# Patient Record
Sex: MALE | Race: BLACK OR AFRICAN AMERICAN | Employment: PART TIME | URBAN - METROPOLITAN AREA
[De-identification: names, ages, dates, MRNs, and addresses within clinical notes are randomized per-mention and may not be internally consistent; named-entity substitution may affect disease eponyms.]

---

## 2017-01-04 DIAGNOSIS — E04.9 GOITER: Primary | ICD-10-CM

## 2017-01-04 DIAGNOSIS — E05.90 HYPERTHYROIDISM: ICD-10-CM

## 2017-01-23 ENCOUNTER — OFFICE VISIT (OUTPATIENT)
Dept: ENDOCRINOLOGY | Age: 52
End: 2017-01-23

## 2017-01-23 VITALS
HEIGHT: 72 IN | WEIGHT: 235 LBS | BODY MASS INDEX: 31.83 KG/M2 | HEART RATE: 87 BPM | DIASTOLIC BLOOD PRESSURE: 71 MMHG | SYSTOLIC BLOOD PRESSURE: 117 MMHG

## 2017-01-23 DIAGNOSIS — Z79.4 TYPE 2 DIABETES MELLITUS WITH COMPLICATION, WITH LONG-TERM CURRENT USE OF INSULIN (HCC): Primary | ICD-10-CM

## 2017-01-23 DIAGNOSIS — R80.9 MICROALBUMINURIA: ICD-10-CM

## 2017-01-23 DIAGNOSIS — E11.8 TYPE 2 DIABETES MELLITUS WITH COMPLICATION, WITH LONG-TERM CURRENT USE OF INSULIN (HCC): Primary | ICD-10-CM

## 2017-01-23 DIAGNOSIS — E04.9 GOITER: ICD-10-CM

## 2017-01-23 DIAGNOSIS — E05.90 HYPERTHYROIDISM: ICD-10-CM

## 2017-01-23 DIAGNOSIS — I10 ESSENTIAL HYPERTENSION: ICD-10-CM

## 2017-01-23 RX ORDER — LISINOPRIL 40 MG/1
40 TABLET ORAL DAILY
COMMUNITY
End: 2017-06-02 | Stop reason: SDUPTHER

## 2017-01-23 NOTE — PROGRESS NOTES
History of Present Illness: Ольга Davis. is a 45 y.o. male presents for follow-up of diabetes and hyperthyroidism/goiter  He has had diabetes for 20 years, since 1995. A1c has been stable at 7.0    Has Graves disease. Started on methimazole in early August 2016. Labs were at goal in 10/2016 on methimazole 10 mg daily. Due to a miscomunication he stopped methimazole around the holidays when it was determined surgery would be best long-term treatment option for him. Reports his enlarged thyroid dates back to late 1990s. Since stopping methimazole he feels well. No heat intolerance, anxiety problems, trouble sleeping. Wt stable to increased. Has continued having problems swallowing. Sleeping is also a challenge. Has trouble breathing when he sleeps on his right side. Neck size of shirt has increased from 18\" to 19\" over last few years. Diabetes related complications:  + microalbuminuria  No known retinopathy  No sx of nephropathy    Current diabetes regimen:  NPH  16 units twice daily  Regular 4 units breakfast, lunch, night. Glucoses:  Check infrequently. No sx of lows. 'mid 100s'  (140-180)    Diet:  Trying to watch carb intake     Blood pressure: lisinopril 40 mg daily.  (fould telmisartan/amlodipine did not control as well)     Lipids: No statin therapy currently. Social:  Just . Had honeymoon over holidays and New Year    Past Medical History   Diagnosis Date    Albuminuria     Hypertension     Type 2 diabetes mellitus ([facility] [address]      1995     Current Outpatient Prescriptions   Medication Sig    lisinopril (PRINIVIL, ZESTRIL) 40 mg tablet Take 40 mg by mouth daily.  metFORMIN (GLUCOPHAGE) 1,000 mg tablet Take 1 Tab by mouth two (2) times daily (with meals).  insulin regular (NOVOLIN R, HUMULIN R) 100 unit/mL injection 4-10 units with meals.  insulin NPH (NOVOLIN N, HUMULIN N) 100 unit/mL injection 16 Units by SubCUTAneous route every twelve (12) hours.  (Patient taking differently: 16 Units by SubCUTAneous route every twelve (12) hours. Indications: PAP medication)    aspirin delayed-release (ASPIR-LOW) 81 mg tablet Take 81 mg by mouth daily.  amitriptyline (ELAVIL) 25 mg tablet Take 2 Tabs by mouth nightly.  Telmisartan-amLODIPine 80-5 mg tab Take 1 Each by mouth nightly. For blood pressure    methimazole (TAPAZOLE) 10 mg tablet Take 2 Tabs by mouth daily. For hyperthyroidism (Patient taking differently: Take 10 mg by mouth daily. For hyperthyroidism)     No current facility-administered medications for this visit.       Allergies   Allergen Reactions    Penicillins Hives     Review of Systems:  - Eyes: no blurry vision or double vision - saw eye doctor and had had dilated eye exam   - Cardiovascular: no chest pain  - Respiratory: see HPI  Physical Examination:  Visit Vitals    /71    Pulse 87    Ht 6' 0.44\" (1.84 m)    Wt 235 lb (106.6 kg)    BMI 31.49 kg/m2   -   - General: pleasant, no distress, normal gait   HEENT: hearing intact, EOMI, clear sclera without icterus  - Neck: + right thyromegaly diffuse and soft, isthmus and left lobe are mildly enlarged  - Cardiovascular: regular, normal rate   - Respiratory: normal effort  - Integumentary: no edema  - Psychiatric: normal mood and affect    Data Reviewed:   Component      Latest Ref Rng & Units 10/25/2016 10/25/2016 10/25/2016 10/25/2016          12:13 PM 12:13 PM 12:13 PM 12:13 PM   Glucose      65 - 99 mg/dL    78   BUN      6 - 24 mg/dL    22   Creatinine      0.76 - 1.27 mg/dL    1.70 (H)   GFR est non-AA      >59 mL/min/1.73    46 (L)   GFR est AA      >59 mL/min/1.73    53 (L)   BUN/Creatinine ratio      9 - 20    13   Sodium      136 - 144 mmol/L    140   Potassium      3.5 - 5.2 mmol/L    4.7   Chloride      97 - 106 mmol/L    102   CO2      18 - 29 mmol/L    21   Calcium      8.7 - 10.2 mg/dL    9.0   Hemoglobin A1c, (calculated)      4.8 - 5.6 %   7.0 (H)    Estimated average glucose mg/dL   154    T4, Free      0.82 - 1.77 ng/dL  1.03     TSH      0.450 - 4.500 uIU/mL 1.300        Component      Latest Ref Rng & Units 7/19/2016 7/19/2016 7/19/2016           3:33 PM  3:33 PM  3:33 PM   Creatinine, urine      Not Estab. mg/dL   129.0   Microalbumin, urine      Not Estab. ug/mL   235.4   Microalbumin/Creat. Ratio      0.0 - 30.0 mg/g creat   182.5 (H)   Thyroid peroxidase Ab      0 - 34 IU/mL  <6    Thyrotropin Receptor Ab, serum      0.00 - 1.75 IU/L 3.89 (H)       Assessment/Plan:   1. Type 2 diabetes mellitus with complication, with long-term current use of insulin (HCC)   - sounds well controlled  - check A1c  - continue NPH 16 units twice daily and regular 4 units with meals  - continue dietary efforts   2. Hyperthyroidism   - labs off methimazole for a few months  - likely resume until surgery. 10 mg worked well in fall. 3. Goiter   - due to Graves' disease AND nodules  - recommend thyroidectomy. Referred to Dr Rojelio Hackett   4. Microalbuminuria - BP, diabetes control, continue lisinopril   5. Essential hypertension - controlled. Continue lisinopril     Patient Instructions   Diabetes:  Continue metformin. - continue NPH 16 units twice daily  - Humalog - continue 4 units in AM, can take 4-8 units with lunch and dinner    Hyperthyroidism and enlarged thyroid  Repeat labs off methimazole    If hyperthyroid, may have you resume methimazole until thyroid removed surgically    Schedule appointment with Dr Rojelio [facility]  [address] [place] [address] Street, [address]    Phone: [phone]                 Follow-up Disposition:  Return in about 3 months (around 4/23/2017).     Copy sent to:

## 2017-01-23 NOTE — MR AVS SNAPSHOT
Visit Information Date & Time Provider Department Dept. Phone Encounter #  
 1/23/2017  8:50 AM Seth Dorantes [facility] [phone] [id number] Follow-up Instructions Return in about 3 months (around 4/23/2017). Upcoming Health Maintenance Date Due  
 EYE EXAM RETINAL OR DILATED Q1 11/4/1974 Pneumococcal 19-64 Medium Risk (1 of 1 - PPSV23) 11/4/1983 DTaP/Tdap/Td series (1 - Tdap) 5/17/2010 FOBT Q 1 YEAR AGE 50-75 11/4/2014 INFLUENZA AGE 9 TO ADULT 8/1/2016 HEMOGLOBIN A1C Q6M 4/25/2017 LIPID PANEL Q1 5/12/2017 FOOT EXAM Q1 5/17/2017 MICROALBUMIN Q1 7/19/2017 Allergies as of 1/23/2017  Review Complete On: 1/23/2017 By: Larissa Ford MD  
  
 Severity Noted Reaction Type Reactions Penicillins  05/15/2010    Hives Current Immunizations  Never Reviewed Name Date  
 TD Vaccine 5/16/2010  2:00 AM  
  
 Not reviewed this visit You Were Diagnosed With   
  
 Codes Comments Type 2 diabetes mellitus with complication, with long-term current use of insulin (HCC)    -  Primary ICD-10-CM: E11.8, Z79.4 ICD-9-CM: 250.90, V58.67 Hyperthyroidism     ICD-10-CM: E05.90 ICD-9-CM: 242.90 Goiter     ICD-10-CM: E04.9 ICD-9-CM: 240.9 Vitals BP Pulse Height(growth percentile) Weight(growth percentile) BMI Smoking Status 117/71 87 6' 0.44\" (1.84 m) 235 lb (106.6 kg) 31.49 kg/m2 Never Smoker Vitals History BMI and BSA Data Body Mass Index Body Surface Area  
 31.49 kg/m 2 2.33 m 2 Preferred Pharmacy Pharmacy Name Phone [facility] [address] Your Updated Medication List  
  
   
This list is accurate as of: 1/23/17  9:31 AM.  Always use your most recent med list.  
  
  
  
  
 ASPIR-LOW 81 mg tablet Generic drug:  aspirin delayed-release Take 81 mg by mouth daily. insulin  unit/mL injection Commonly known as:  NOVOLIN N, HUMULIN N  
16 Units by SubCUTAneous route every twelve (12) hours. insulin regular 100 unit/mL injection Commonly known as:  NOVOLIN R, HUMULIN R  
4-10 units with meals. lisinopril 40 mg tablet Commonly known as:  Elayne Burger Take 40 mg by mouth daily. metFORMIN 1,000 mg tablet Commonly known as:  GLUCOPHAGE Take 1 Tab by mouth two (2) times daily (with meals). methIMAzole 10 mg tablet Commonly known as:  TAPAZOLE Take 2 Tabs by mouth daily. For hyperthyroidism We Performed the Following HEMOGLOBIN A1C WITH EAG [21693 CPT(R)] LIPID PANEL [88671 CPT(R)] METABOLIC PANEL, COMPREHENSIVE [33010 CPT(R)] T4, FREE [id number] CPT(R)] TSH 3RD GENERATION [59568 CPT(R)] Follow-up Instructions Return in about 3 months (around 4/23/2017). Patient Instructions Diabetes: 
Continue metformin. - continue NPH 16 units twice daily - Humalog - continue 4 units in AM, can take 4-8 units with lunch and dinner Hyperthyroidism and enlarged thyroid Repeat labs off methimazole If hyperthyroid, may have you resume methimazole until thyroid removed surgically Schedule appointment with Dr Valle Grade Dr Albina Figueroa [facility] [address] Phone: [phone] Introducing [facility]! Zev Grossman introduces [employer] patient portal. Now you can access parts of your medical record, email your doctor's office, and request medication refills online. 1. In your internet browser, go to [email or web address] 2. Click on the First Time User? Click Here link in the Sign In box. You will see the New Member Sign Up page. 3. Enter your [employer] Access Code exactly as it appears below. You will not need to use this code after youve completed the sign-up process.  If you do not sign up before the expiration date, you must request a new code. · [employer] Access Code: [email or web address] Expires: 4/23/2017  9:31 AM 
 
4. Enter the last four digits of your Social Security Number (xxxx) and Date of Birth (mm/dd/yyyy) as indicated and click Submit. You will be taken to the next sign-up page. 5. Create a [employer] ID. This will be your [employer] login ID and cannot be changed, so think of one that is secure and easy to remember. 6. Create a [employer] password. You can change your password at any time. 7. Enter your Password Reset Question and Answer. This can be used at a later time if you forget your password. 8. Enter your e-mail address. You will receive e-mail notification when new information is available in [address] 9. Click Sign Up. You can now view and download portions of your medical record. 10. Click the Download Summary menu link to download a portable copy of your medical information. If you have questions, please visit the Frequently Asked Questions section of the [employer] website. Remember, [employer] is NOT to be used for urgent needs. For medical emergencies, dial 911. Now available from your iPhone and Android! Please provide this summary of care documentation to your next provider. Your primary care clinician is listed as Avis Dimas. If you have any questions after today's visit, please call [phone].

## 2017-01-23 NOTE — PATIENT INSTRUCTIONS
Diabetes:  Continue metformin.     - continue NPH 16 units twice daily  - Humalog - continue 4 units in AM, can take 4-8 units with lunch and dinner    Hyperthyroidism and enlarged thyroid  Repeat labs off methimazole    If hyperthyroid, may have you resume methimazole until thyroid removed surgically    Schedule appointment with Dr Valle Grade  Dr Bello Huggins [facility]  [address], [address]    Phone: [phone]

## 2017-01-24 LAB
ALBUMIN SERPL-MCNC: 3.8 G/DL (ref 3.5–5.5)
ALBUMIN/GLOB SERPL: 1.2 {RATIO} (ref 1.1–2.5)
ALP SERPL-CCNC: 139 IU/L (ref 39–117)
ALT SERPL-CCNC: 30 IU/L (ref 0–44)
AST SERPL-CCNC: 29 IU/L (ref 0–40)
BILIRUB SERPL-MCNC: 0.3 MG/DL (ref 0–1.2)
BUN SERPL-MCNC: 22 MG/DL (ref 6–24)
BUN/CREAT SERPL: 16 (ref 9–20)
CALCIUM SERPL-MCNC: 9.1 MG/DL (ref 8.7–10.2)
CHLORIDE SERPL-SCNC: 100 MMOL/L (ref 96–106)
CHOLEST SERPL-MCNC: 183 MG/DL (ref 100–199)
CO2 SERPL-SCNC: 22 MMOL/L (ref 18–29)
CREAT SERPL-MCNC: 1.41 MG/DL (ref 0.76–1.27)
EST. AVERAGE GLUCOSE BLD GHB EST-MCNC: 192 MG/DL
GLOBULIN SER CALC-MCNC: 3.1 G/DL (ref 1.5–4.5)
GLUCOSE SERPL-MCNC: 85 MG/DL (ref 65–99)
HBA1C MFR BLD: 8.3 % (ref 4.8–5.6)
HDLC SERPL-MCNC: 56 MG/DL
LDLC SERPL CALC-MCNC: 108 MG/DL (ref 0–99)
POTASSIUM SERPL-SCNC: 4.7 MMOL/L (ref 3.5–5.2)
PROT SERPL-MCNC: 6.9 G/DL (ref 6–8.5)
SODIUM SERPL-SCNC: 137 MMOL/L (ref 134–144)
T4 FREE SERPL-MCNC: 1.58 NG/DL (ref 0.82–1.77)
TRIGL SERPL-MCNC: 96 MG/DL (ref 0–149)
TSH SERPL DL<=0.005 MIU/L-ACNC: <0.006 UIU/ML (ref 0.45–4.5)
VLDLC SERPL CALC-MCNC: 19 MG/DL (ref 5–40)

## 2017-03-11 NOTE — PROGRESS NOTES
Juve Altamirano,  Please call and mail lab letter. Thank you. Kidney function improved. Diabetes - needs to monitor more  Thyroid - mildly hyperthyroid. Will recommend he resume methimazole 10 mg. He was referred to Dr Andreas Ch as he desired thyroidectomy due to size of gland. I do not think he has seen her.

## 2017-03-23 NOTE — PROGRESS NOTES
I spoke with patient and reviewed Dr. Paulino Moss notes regarding his lab results and he voiced understanding. Patient stated he has decided against the thyroidectomy. He would like to have the LAKE treatment. He is traveling this summer with work and would like this ordered as soon as possible.

## 2017-03-28 DIAGNOSIS — E05.90 HYPERTHYROIDISM: Primary | ICD-10-CM

## 2017-03-28 DIAGNOSIS — E04.9 GOITER: ICD-10-CM

## 2017-03-31 NOTE — PROGRESS NOTES
Per Dr. Michelle Granger: Christy Lees ordered this. Please be sure he stops the methimazole 5-7 days prior to the LAKE treatment. He should also have labs and follow-up visit 10-12 weeks after LAKE therapy. \"  I called patient and read Dr. Kirstie Sauer message and he voiced understanding. Patient will await call from scheduling.

## 2017-04-03 ENCOUNTER — APPOINTMENT (OUTPATIENT)
Dept: GENERAL RADIOLOGY | Age: 52
End: 2017-04-03
Attending: EMERGENCY MEDICINE
Payer: COMMERCIAL

## 2017-04-03 ENCOUNTER — HOSPITAL ENCOUNTER (EMERGENCY)
Age: 52
Discharge: HOME | End: 2017-04-03
Attending: EMERGENCY MEDICINE | Admitting: EMERGENCY MEDICINE
Payer: COMMERCIAL

## 2017-04-03 VITALS
TEMPERATURE: 98.7 F | WEIGHT: 235 LBS | HEIGHT: 74 IN | HEART RATE: 113 BPM | OXYGEN SATURATION: 96 % | BODY MASS INDEX: 30.16 KG/M2 | DIASTOLIC BLOOD PRESSURE: 90 MMHG | SYSTOLIC BLOOD PRESSURE: 163 MMHG | RESPIRATION RATE: 15 BRPM

## 2017-04-03 DIAGNOSIS — R73.9 HYPERGLYCEMIA: ICD-10-CM

## 2017-04-03 DIAGNOSIS — J18.9 CAP (COMMUNITY ACQUIRED PNEUMONIA): Primary | ICD-10-CM

## 2017-04-03 LAB
ALBUMIN SERPL BCP-MCNC: 3.3 G/DL (ref 3.5–5)
ALBUMIN/GLOB SERPL: 0.8 {RATIO} (ref 1.1–2.2)
ALP SERPL-CCNC: 146 U/L (ref 45–117)
ALT SERPL-CCNC: 13 U/L (ref 12–78)
ANION GAP BLD CALC-SCNC: 8 MMOL/L (ref 5–15)
APPEARANCE UR: CLEAR
AST SERPL W P-5'-P-CCNC: 5 U/L (ref 15–37)
BACTERIA URNS QL MICRO: NEGATIVE /HPF
BASOPHILS # BLD AUTO: 0 K/UL (ref 0–0.1)
BASOPHILS # BLD: 0 % (ref 0–1)
BILIRUB SERPL-MCNC: 0.4 MG/DL (ref 0.2–1)
BILIRUB UR QL: NEGATIVE
BUN SERPL-MCNC: 29 MG/DL (ref 6–20)
BUN/CREAT SERPL: 13 (ref 12–20)
CALCIUM SERPL-MCNC: 9 MG/DL (ref 8.5–10.1)
CHLORIDE SERPL-SCNC: 95 MMOL/L (ref 97–108)
CK MB CFR SERPL CALC: 1.9 % (ref 0–2.5)
CK MB SERPL-MCNC: 1.7 NG/ML (ref 5–25)
CK SERPL-CCNC: 90 U/L (ref 39–308)
CO2 SERPL-SCNC: 27 MMOL/L (ref 21–32)
COLOR UR: ABNORMAL
CREAT SERPL-MCNC: 2.22 MG/DL (ref 0.7–1.3)
EOSINOPHIL # BLD: 0.1 K/UL (ref 0–0.4)
EOSINOPHIL NFR BLD: 0 % (ref 0–7)
EPITH CASTS URNS QL MICRO: ABNORMAL /LPF
ERYTHROCYTE [DISTWIDTH] IN BLOOD BY AUTOMATED COUNT: 12.1 % (ref 11.5–14.5)
GLOBULIN SER CALC-MCNC: 4.4 G/DL (ref 2–4)
GLUCOSE BLD STRIP.AUTO-MCNC: 321 MG/DL (ref 65–100)
GLUCOSE SERPL-MCNC: 347 MG/DL (ref 65–100)
GLUCOSE UR STRIP.AUTO-MCNC: >1000 MG/DL
HCT VFR BLD AUTO: 40.4 % (ref 36.6–50.3)
HGB BLD-MCNC: 14.2 G/DL (ref 12.1–17)
HGB UR QL STRIP: ABNORMAL
HYALINE CASTS URNS QL MICRO: ABNORMAL /LPF (ref 0–5)
KETONES UR QL STRIP.AUTO: NEGATIVE MG/DL
LEUKOCYTE ESTERASE UR QL STRIP.AUTO: NEGATIVE
LIPASE SERPL-CCNC: 159 U/L (ref 73–393)
LYMPHOCYTES # BLD AUTO: 21 % (ref 12–49)
LYMPHOCYTES # BLD: 3.3 K/UL (ref 0.8–3.5)
MCH RBC QN AUTO: 29.4 PG (ref 26–34)
MCHC RBC AUTO-ENTMCNC: 35.1 G/DL (ref 30–36.5)
MCV RBC AUTO: 83.6 FL (ref 80–99)
MONOCYTES # BLD: 0.7 K/UL (ref 0–1)
MONOCYTES NFR BLD AUTO: 5 % (ref 5–13)
NEUTS SEG # BLD: 11.3 K/UL (ref 1.8–8)
NEUTS SEG NFR BLD AUTO: 74 % (ref 32–75)
NITRITE UR QL STRIP.AUTO: NEGATIVE
PH UR STRIP: 5 [PH] (ref 5–8)
PLATELET # BLD AUTO: 352 K/UL (ref 150–400)
POTASSIUM SERPL-SCNC: 4.1 MMOL/L (ref 3.5–5.1)
PROT SERPL-MCNC: 7.7 G/DL (ref 6.4–8.2)
PROT UR STRIP-MCNC: 100 MG/DL
RBC # BLD AUTO: 4.83 M/UL (ref 4.1–5.7)
RBC #/AREA URNS HPF: ABNORMAL /HPF (ref 0–5)
SERVICE CMNT-IMP: ABNORMAL
SODIUM SERPL-SCNC: 130 MMOL/L (ref 136–145)
SP GR UR REFRACTOMETRY: 1.03 (ref 1–1.03)
TROPONIN I SERPL-MCNC: <0.04 NG/ML
UROBILINOGEN UR QL STRIP.AUTO: 1 EU/DL (ref 0.2–1)
WBC # BLD AUTO: 15.4 K/UL (ref 4.1–11.1)
WBC URNS QL MICRO: ABNORMAL /HPF (ref 0–4)

## 2017-04-03 PROCEDURE — 80053 COMPREHEN METABOLIC PANEL: CPT | Performed by: EMERGENCY MEDICINE

## 2017-04-03 PROCEDURE — 96374 THER/PROPH/DIAG INJ IV PUSH: CPT

## 2017-04-03 PROCEDURE — 93005 ELECTROCARDIOGRAM TRACING: CPT

## 2017-04-03 PROCEDURE — 82962 GLUCOSE BLOOD TEST: CPT

## 2017-04-03 PROCEDURE — 74011250636 HC RX REV CODE- 250/636: Performed by: EMERGENCY MEDICINE

## 2017-04-03 PROCEDURE — 82550 ASSAY OF CK (CPK): CPT | Performed by: EMERGENCY MEDICINE

## 2017-04-03 PROCEDURE — 36415 COLL VENOUS BLD VENIPUNCTURE: CPT | Performed by: EMERGENCY MEDICINE

## 2017-04-03 PROCEDURE — 74011250637 HC RX REV CODE- 250/637: Performed by: EMERGENCY MEDICINE

## 2017-04-03 PROCEDURE — 99285 EMERGENCY DEPT VISIT HI MDM: CPT

## 2017-04-03 PROCEDURE — 83690 ASSAY OF LIPASE: CPT | Performed by: EMERGENCY MEDICINE

## 2017-04-03 PROCEDURE — 84484 ASSAY OF TROPONIN QUANT: CPT | Performed by: EMERGENCY MEDICINE

## 2017-04-03 PROCEDURE — 71020 XR CHEST PA LAT: CPT

## 2017-04-03 PROCEDURE — 81001 URINALYSIS AUTO W/SCOPE: CPT | Performed by: EMERGENCY MEDICINE

## 2017-04-03 PROCEDURE — 96375 TX/PRO/DX INJ NEW DRUG ADDON: CPT

## 2017-04-03 PROCEDURE — 74011000250 HC RX REV CODE- 250: Performed by: EMERGENCY MEDICINE

## 2017-04-03 PROCEDURE — 85025 COMPLETE CBC W/AUTO DIFF WBC: CPT | Performed by: EMERGENCY MEDICINE

## 2017-04-03 PROCEDURE — 96361 HYDRATE IV INFUSION ADD-ON: CPT

## 2017-04-03 RX ORDER — DIPHENHYDRAMINE HYDROCHLORIDE 50 MG/ML
25 INJECTION, SOLUTION INTRAMUSCULAR; INTRAVENOUS
Status: COMPLETED | OUTPATIENT
Start: 2017-04-03 | End: 2017-04-03

## 2017-04-03 RX ORDER — LEVOFLOXACIN 750 MG/1
750 TABLET ORAL DAILY
Qty: 5 TAB | Refills: 0 | Status: SHIPPED | OUTPATIENT
Start: 2017-04-03 | End: 2017-04-08

## 2017-04-03 RX ORDER — PROCHLORPERAZINE EDISYLATE 5 MG/ML
10 INJECTION INTRAMUSCULAR; INTRAVENOUS
Status: DISCONTINUED | OUTPATIENT
Start: 2017-04-03 | End: 2017-04-03 | Stop reason: SDUPTHER

## 2017-04-03 RX ORDER — LEVOFLOXACIN 500 MG/1
500 TABLET, FILM COATED ORAL
Status: COMPLETED | OUTPATIENT
Start: 2017-04-03 | End: 2017-04-03

## 2017-04-03 RX ADMIN — DIPHENHYDRAMINE HYDROCHLORIDE 25 MG: 50 INJECTION, SOLUTION INTRAMUSCULAR; INTRAVENOUS at 21:42

## 2017-04-03 RX ADMIN — SODIUM CHLORIDE 1000 ML: 900 INJECTION, SOLUTION INTRAVENOUS at 20:47

## 2017-04-03 RX ADMIN — SODIUM CHLORIDE 10 MG: 9 INJECTION INTRAMUSCULAR; INTRAVENOUS; SUBCUTANEOUS at 21:46

## 2017-04-03 RX ADMIN — SODIUM CHLORIDE 1000 ML: 900 INJECTION, SOLUTION INTRAVENOUS at 20:49

## 2017-04-03 RX ADMIN — LEVOFLOXACIN 500 MG: 500 TABLET, FILM COATED ORAL at 21:31

## 2017-04-03 NOTE — ED TRIAGE NOTES
Pt reports his BS was 430 @ 1530 today. Pt was seen @ an ER in [place], [place] and was given fluids and insulin to get BS down and was given Lisinopril due to elevated BP. Pt states he is also experiencing a HA and loud noises make his head throb more. Pt denies N/V/D.

## 2017-04-04 LAB
ATRIAL RATE: 106 BPM
CALCULATED P AXIS, ECG09: 22 DEGREES
CALCULATED R AXIS, ECG10: -50 DEGREES
CALCULATED T AXIS, ECG11: 17 DEGREES
DIAGNOSIS, 93000: NORMAL
P-R INTERVAL, ECG05: 158 MS
Q-T INTERVAL, ECG07: 330 MS
QRS DURATION, ECG06: 90 MS
QTC CALCULATION (BEZET), ECG08: 438 MS
VENTRICULAR RATE, ECG03: 106 BPM

## 2017-04-04 NOTE — ED PROVIDER NOTES
HPI Comments: 45 y.o. male with past medical history significant for DM, HTN, albuminuria who presents with chief complaint of headache. Pt reports headache and increased urinary frequency onset at 0800,(approx 12.5 hours ago) and says that his BS is high, has taken multiple values today with high numbers. He also reports diarrhea a couple of days ago and a decreased appetite recently. Pt reports that he was recently discharged from a hospital in [place], [place] ([facility]) and that \"they were able to get my BS down to 160's\". He reports that he takes humulin N and R, lisinopril and losartan. He notes that he is scheduled for a thyroidectomy this week, but does not remember where, and that he also has a dentist appt tomorrow. Pt denies numbness, tingling, fever, chills, nausea, vomiting. There are no other acute medical concerns at this time. PCP: Wil Yung MD    Note written by Leonard Jane, as dictated by Bipin Tinajero MD 8:26 PM       The history is provided by the patient, the spouse and a relative. Past Medical History:   Diagnosis Date    Albuminuria     Hypertension     Type 2 diabetes mellitus ([facility] [address]     1995       Past Surgical History:   Procedure Laterality Date    HX SEPTOPLASTY           Family History:   Problem Relation Age of Onset    Thyroid Disease Mother      had LAKE    Diabetes Neg Hx        Social History     Social History    Marital status:      Spouse name: N/A    Number of children: N/A    Years of education: N/A     Occupational History    Not on file.      Social History Main Topics    Smoking status: Never Smoker    Smokeless tobacco: Not on file    Alcohol use 0.0 oz/week     0 Standard drinks or equivalent per week      Comment: occ    Drug use: No    Sexual activity: Not on file     Other Topics Concern    Not on file     Social History Narrative    5/2016: used to work as , not working in last year due to diabetes. Lives with his fiance. ALLERGIES: Penicillins    Review of Systems   Constitutional: Positive for appetite change (decreased). Negative for chills and fever. Gastrointestinal: Positive for diarrhea. Negative for nausea and vomiting. Genitourinary: Positive for frequency. Neurological: Positive for headaches. Negative for numbness. All other systems reviewed and are negative. Vitals:    04/03/17 1816 04/03/17 2051   BP: 133/88 146/77   Pulse: (!) 115 (!) 109   Resp: 16 21   Temp: 98.9 °F (37.2 °C) 98.6 °F (37 °C)   SpO2: 100% 98%   Weight: 106.6 kg (235 lb)    Height: 6' 2\" (1.88 m)             Physical Exam   Constitutional: He is oriented to person, place, and time. He appears well-developed and well-nourished. No distress. NAD, AxOx4, speaking in complete sentences     HENT:   Head: Normocephalic and atraumatic. Nose: Nose normal.   Mouth/Throat: Oropharynx is clear and moist. No oropharyngeal exudate. Cn intact    No meningeal signs;    Eyes: Conjunctivae and EOM are normal. Pupils are equal, round, and reactive to light. Right eye exhibits no discharge. Left eye exhibits no discharge. Neck: Normal range of motion. Neck supple. Cardiovascular: Normal rate, regular rhythm, normal heart sounds and intact distal pulses. Exam reveals no gallop and no friction rub. No murmur heard. Pulmonary/Chest: Effort normal and breath sounds normal. No respiratory distress. He has no wheezes. He has no rales. He exhibits no tenderness. Abdominal: Soft. Bowel sounds are normal. He exhibits no distension and no mass. There is no tenderness. There is no rebound and no guarding. nttp     Genitourinary:   Genitourinary Comments: Pt denies urinary/ Testicular/ scrotal or penile  complaints   Musculoskeletal: Normal range of motion. He exhibits no edema, tenderness or deformity. Lymphadenopathy:     He has no cervical adenopathy.    Neurological: He is alert and oriented to person, place, and time. He has normal reflexes. No cranial nerve deficit. Coordination normal.   pt has motor/ CV/ Sensation grossly intact to all extremities, R = L in strength;   Skin: Skin is warm and dry. No rash noted. No erythema. Psychiatric: He has a normal mood and affect. Nursing note and vitals reviewed. [facility]  ED Course       Procedures     Chief Complaint   Patient presents with    High Blood Sugar       9:39 PM  The patients presenting problems have been discussed, and they are in agreement with the care plan formulated and outlined with them. I have encouraged them to ask questions as they arise throughout their visit. MEDICATIONS GIVEN:  Medications   sodium chloride 0.9 % bolus infusion 1,000 mL (1,000 mL IntraVENous New Bag 4/3/17 2049)   sodium chloride 0.9 % bolus infusion 1,000 mL (1,000 mL IntraVENous New Bag 4/3/17 2047)   diphenhydrAMINE (BENADRYL) injection 25 mg (not administered)   prochlorperazine (COMPAZINE) with saline injection 10 mg (not administered)   levoFLOXacin (LEVAQUIN) tablet 500 mg (500 mg Oral Given 4/3/17 2131)       LABS REVIEWED:  Labs Reviewed   CBC WITH AUTOMATED DIFF - Abnormal; Notable for the following:        Result Value    WBC 15.4 (*)     ABS. NEUTROPHILS 11.3 (*)     All other components within normal limits   METABOLIC PANEL, COMPREHENSIVE - Abnormal; Notable for the following:     Sodium 130 (*)     Chloride 95 (*)     Glucose 347 (*)     BUN 29 (*)     Creatinine 2.22 (*)     GFR est AA 38 (*)     GFR est non-AA 31 (*)     AST (SGOT) 5 (*)     Alk.  phosphatase 146 (*)     Albumin 3.3 (*)     Globulin 4.4 (*)     A-G Ratio 0.8 (*)     All other components within normal limits   URINALYSIS W/MICROSCOPIC - Abnormal; Notable for the following:     Protein 100 (*)     Glucose >1000 (*)     Blood TRACE (*)     All other components within normal limits   GLUCOSE, POC - Abnormal; Notable for the following:     Glucose (POC) 321 (*)     All other components within normal limits   SAMPLES BEING HELD   CK W/ CKMB & INDEX   LIPASE   TROPONIN I   POC GLUCOSE       RADIOLOGY RESULTS:  The following have been ordered and reviewed:  _____________________________________________________________________  _____________________________________________________________________    EKG interpretation: (Preliminary)  Rhythm: sinus tachycardia  rhythm; and regular . Rate (approx.): 106; Axis: normal; P wave: normal; QRS interval: normal ; ST/T wave: normal; Negative acute significant segmental elevations    PROCEDURES:        CONSULTATIONS:       PROGRESS NOTES:      DIAGNOSIS:    1. CAP (community acquired pneumonia)    2. Hyperglycemia        PLAN:  1- CAP - levaquin;   2 elevated BS/ PCP follow-up;       ED COURSE: The patients hospital course has been uncomplicated. PROGRESS NOTE:  8:26 PM  Pt's BS in ED is 321 today. 9:40 PM  Poonam Hutchinson Jr.'s  results have been reviewed with him. He has been counseled regarding his diagnosis. He verbally conveys understanding and agreement of the signs, symptoms, diagnosis, treatment and prognosis and additionally agrees to Call/ Arrange follow up as recommended with Dr. Ramos Carbajal MD in 24 - 48 hours. He also agrees with the care-plan and conveys that all of his questions have been answered. I have also put together some discharge instructions for him that include: 1) educational information regarding their diagnosis, 2) how to care for their diagnosis at home, as well a 3) list of reasons why they would want to return to the ED prior to their follow-up appointment, should their condition change or for concerns.

## 2017-04-04 NOTE — ED NOTES
Pt received d/c instructions and understands to complete full dose of antibiotic for PN, no questions or needs at this time, VSS, and ambulating out to car with family.

## 2017-04-04 NOTE — DISCHARGE INSTRUCTIONS
Pneumonia: Care Instructions  Your Care Instructions    Pneumonia is an infection of the lungs. Most cases are caused by infections from bacteria or viruses. Pneumonia may be mild or very severe. If it is caused by bacteria, you will be treated with antibiotics. It may take a few weeks to a few months to recover fully from pneumonia, depending on how sick you were and whether your overall health is good. Follow-up care is a key part of your treatment and safety. Be sure to make and go to all appointments, and call your doctor if you are having problems. Its also a good idea to know your test results and keep a list of the medicines you take. How can you care for yourself at home? · Take your antibiotics exactly as directed. Do not stop taking the medicine just because you are feeling better. You need to take the full course of antibiotics. · Take your medicines exactly as prescribed. Call your doctor if you think you are having a problem with your medicine. · Get plenty of rest and sleep. You may feel weak and tired for a while, but your energy level will improve with time. · To prevent dehydration, drink plenty of fluids, enough so that your urine is light yellow or clear like water. Choose water and other caffeine-free clear liquids until you feel better. If you have kidney, heart, or liver disease and have to limit fluids, talk with your doctor before you increase the amount of fluids you drink. · Take care of your cough so you can rest. A cough that brings up mucus from your lungs is common with pneumonia. It is one way your body gets rid of the infection. But if coughing keeps you from resting or causes severe fatigue and chest-wall pain, talk to your doctor. He or she may suggest that you take a medicine to reduce the cough. · Use a vaporizer or humidifier to add moisture to your bedroom. Follow the directions for cleaning the machine. · Do not smoke or allow others to smoke around you.  Smoke will make your cough last longer. If you need help quitting, talk to your doctor about stop-smoking programs and medicines. These can increase your chances of quitting for good. · Take an over-the-counter pain medicine, such as acetaminophen (Tylenol), ibuprofen (Advil, Motrin), or naproxen (Aleve). Read and follow all instructions on the label. · Do not take two or more pain medicines at the same time unless the doctor told you to. Many pain medicines have acetaminophen, which is Tylenol. Too much acetaminophen (Tylenol) can be harmful. · If you were given a spirometer to measure how well your lungs are working, use it as instructed. This can help your doctor tell how your recovery is going. · To prevent pneumonia in the future, talk to your doctor about getting a flu vaccine (once a year) and a pneumococcal vaccine (one time only for most people). When should you call for help? Call 911 anytime you think you may need emergency care. For example, call if:  · You have severe trouble breathing. Call your doctor now or seek immediate medical care if:  · You cough up dark brown or bloody mucus (sputum). · You have new or worse trouble breathing. · You are dizzy or lightheaded, or you feel like you may faint. Watch closely for changes in your health, and be sure to contact your doctor if:  · You have a new or higher fever. · You are coughing more deeply or more often. · You are not getting better after 2 days (48 hours). · You do not get better as expected. Where can you learn more? Go to http://rita-shakeel.info/. Enter [id number] in the search box to learn more about \"Pneumonia: Care Instructions. \"  Current as of: May 23, 2016  Content Version: 11.2  © 2448-9937 [employer]. Care instructions adapted under license by [employer] (which disclaims liability or warranty for this information).  If you have questions about a medical condition or this instruction, always ask your healthcare professional. [place] [address] any warranty or liability for your use of this information. Learning About High Blood Sugar  What is high blood sugar? Your body turns the food you eat into glucose (sugar), which it uses for energy. But if your body isn't able to use the sugar right away, it can build up in your blood and lead to high blood sugar. When the amount of sugar in your blood stays too high for too much of the time, you may have diabetes. Diabetes is a disease that can cause serious health problems. The good news is that lifestyle changes may help you get your blood sugar back to normal and avoid or delay diabetes. What causes high blood sugar? Sugar (glucose) can build up in your blood if you:  · Are overweight. · Have a family history of diabetes. · Take certain medicines, such as steroids. What are the symptoms? Having high blood sugar may not cause any symptoms at all. Or it may make you feel very thirsty or very hungry. You may also urinate more often than usual, have blurry vision, or lose weight without trying. How is high blood sugar treated? You can take steps to lower your blood sugar level if you understand what makes it get higher. Your doctor may want you to learn how to test your blood sugar level at home. Then you can see how illness, stress, or different kinds of food or medicine raise or lower your blood sugar level. Other tests may be needed to see if you have diabetes. How can you prevent high blood sugar? · Watch your weight. If you're overweight, losing just a small amount of weight may help. Reducing fat around your waist is most important. · Limit the amount of calories, sweets, and unhealthy fat you eat. Ask your doctor if a dietitian can help you. A registered dietitian can help you create meal plans that fit your lifestyle. · Get at least 30 minutes of exercise on most days of the week.  Exercise helps control your blood sugar. It also helps you maintain a healthy weight. Walking is a good choice. You also may want to do other activities, such as running, swimming, cycling, or playing tennis or team sports. · If your doctor prescribed medicines, take them exactly as prescribed. Call your doctor if you think you are having a problem with your medicine. You will get more details on the specific medicines your doctor prescribes. Follow-up care is a key part of your treatment and safety. Be sure to make and go to all appointments, and call your doctor if you are having problems. It's also a good idea to know your test results and keep a list of the medicines you take. Where can you learn more? Go to http://ritaMojixshakeel.info/. Enter O108 in the search box to learn more about \"Learning About High Blood Sugar. \"  Current as of: May 23, 2016  Content Version: 11.2  © 9960-5451 [employer]. Care instructions adapted under license by [employer] (which disclaims liability or warranty for this information). If you have questions about a medical condition or this instruction, always ask your healthcare professional. [place] [address] any warranty or liability for your use of this information. We hope that we have addressed all of your medical concerns. The examination and treatment you received in the Emergency Department were for an emergent problem and were not intended as complete care. It is important that you follow up with your healthcare provider(s) for ongoing care. If your symptoms worsen or do not improve as expected, and you are unable to reach your usual health care provider(s), you should return to the Emergency Department. Today's healthcare is undergoing tremendous change, and patient satisfaction surveys are one of the many tools to assess the quality of medical care.   You may receive a survey from the [employer] organization regarding your experience in the Emergency Department. I hope that your experience has been completely positive, particularly the medical care that I provided. As such, please participate in the survey; anything less than excellent does not meet my expectations or intentions. [facility] and [facility] participate in nationally recognized quality of care measures. If your blood pressure is greater than 120/80, as reported below, we urge that you seek medical care to address the potential of high blood pressure, commonly known as hypertension. Hypertension can be hereditary or can be caused by certain medical conditions, pain, stress, or \"white coat syndrome. \"       Please make an appointment with your health care provider(s) for follow up of your Emergency Department visit. VITALS:   Patient Vitals for the past 8 hrs:   Temp Pulse Resp BP SpO2   04/03/17 2051 98.6 °F (37 °C) (!) 109 21 146/77 98 %   04/03/17 1816 98.9 °F (37.2 °C) (!) 115 16 133/88 100 %          Thank you for allowing us to provide you with medical care today. We realize that you have many choices for your emergency care needs. Please choose us in the future for any continued health care needs. Lewis Wyatt 77, [facility].   Office: [phone]            Recent Results (from the past 24 hour(s))   CBC WITH AUTOMATED DIFF    Collection Time: 04/03/17  6:27 PM   Result Value Ref Range    WBC 15.4 (H) 4.1 - 11.1 K/uL    RBC 4.83 4.10 - 5.70 M/uL    HGB 14.2 12.1 - 17.0 g/dL    HCT 40.4 36.6 - 50.3 %    MCV 83.6 80.0 - 99.0 FL    MCH 29.4 26.0 - 34.0 PG    MCHC 35.1 30.0 - 36.5 g/dL    RDW 12.1 11.5 - 14.5 %    PLATELET 070 863 - 130 K/uL    NEUTROPHILS 74 32 - 75 %    LYMPHOCYTES 21 12 - 49 %    MONOCYTES 5 5 - 13 %    EOSINOPHILS 0 0 - 7 %    BASOPHILS 0 0 - 1 %    ABS. NEUTROPHILS 11.3 (H) 1.8 - 8.0 K/UL    ABS. LYMPHOCYTES 3.3 0.8 - 3.5 K/UL    ABS. MONOCYTES 0.7 0.0 - 1.0 K/UL    ABS. EOSINOPHILS 0.1 0.0 - 0.4 K/UL    ABS. BASOPHILS 0.0 0.0 - 0.1 K/UL   METABOLIC PANEL, COMPREHENSIVE    Collection Time: 04/03/17  6:27 PM   Result Value Ref Range    Sodium 130 (L) 136 - 145 mmol/L    Potassium 4.1 3.5 - 5.1 mmol/L    Chloride 95 (L) 97 - 108 mmol/L    CO2 27 21 - 32 mmol/L    Anion gap 8 5 - 15 mmol/L    Glucose 347 (H) 65 - 100 mg/dL    BUN 29 (H) 6 - 20 MG/DL    Creatinine 2.22 (H) 0.70 - 1.30 MG/DL    BUN/Creatinine ratio 13 12 - 20      GFR est AA 38 (L) >60 ml/min/1.73m2    GFR est non-AA 31 (L) >60 ml/min/1.73m2    Calcium 9.0 8.5 - 10.1 MG/DL    Bilirubin, total 0.4 0.2 - 1.0 MG/DL    ALT (SGPT) 13 12 - 78 U/L    AST (SGOT) 5 (L) 15 - 37 U/L    Alk.  phosphatase 146 (H) 45 - 117 U/L    Protein, total 7.7 6.4 - 8.2 g/dL    Albumin 3.3 (L) 3.5 - 5.0 g/dL    Globulin 4.4 (H) 2.0 - 4.0 g/dL    A-G Ratio 0.8 (L) 1.1 - 2.2     CK W/ CKMB & INDEX    Collection Time: 04/03/17  6:27 PM   Result Value Ref Range    CK 90 39 - 308 U/L    CK - MB 1.7 <3.6 NG/ML    CK-MB Index 1.9 0 - 2.5     LIPASE    Collection Time: 04/03/17  6:27 PM   Result Value Ref Range    Lipase 159 73 - 393 U/L   TROPONIN I    Collection Time: 04/03/17  6:27 PM   Result Value Ref Range    Troponin-I, Qt. <0.04 <0.05 ng/mL   GLUCOSE, POC    Collection Time: 04/03/17  6:30 PM   Result Value Ref Range    Glucose (POC) 321 (H) 65 - 100 mg/dL    Performed by Reji Garcia    URINALYSIS W/MICROSCOPIC    Collection Time: 04/03/17  8:29 PM   Result Value Ref Range    Color YELLOW/STRAW      Appearance CLEAR CLEAR      Specific gravity 1.029 1.003 - 1.030      pH (UA) 5.0 5.0 - 8.0      Protein 100 (A) NEG mg/dL    Glucose >1000 (A) NEG mg/dL    Ketone NEGATIVE  NEG mg/dL    Bilirubin NEGATIVE  NEG      Blood TRACE (A) NEG      Urobilinogen 1.0 0.2 - 1.0 EU/dL    Nitrites NEGATIVE  NEG      Leukocyte Esterase NEGATIVE  NEG      WBC 0-4 0 - 4 /hpf    RBC 0-5 0 - 5 /hpf    Epithelial cells FEW FEW /lpf Bacteria NEGATIVE  NEG /hpf    Hyaline cast 2-5 0 - 5 /lpf   EKG, 12 LEAD, INITIAL    Collection Time: 04/03/17  8:36 PM   Result Value Ref Range    Ventricular Rate 106 BPM    Atrial Rate 106 BPM    P-R Interval 158 ms    QRS Duration 90 ms    Q-T Interval 330 ms    QTC Calculation (Bezet) 438 ms    Calculated P Axis 22 degrees    Calculated R Axis -50 degrees    Calculated T Axis 17 degrees    Diagnosis       Sinus tachycardia with occasional premature ventricular complexes  Left anterior fascicular block  No previous ECGs available         Xr Chest Pa Lat    Result Date: 4/3/2017  EXAM:  XR CHEST PA LAT INDICATION:   Hyperglycemia. Headaches. Treated for hypertension and hyperglycemia at a different ER, date not specified. COMPARISON: None. FINDINGS: PA and lateral radiographs of the chest demonstrate a vague patchy opacity overlying the T10 and T9 vertebra on the lateral image. The cardiac and mediastinal contours and pulmonary vascularity are normal.  The bones and soft tissues are within normal limits.      IMPRESSION: Possible early lower lobe pneumonia

## 2017-04-04 NOTE — ED NOTES
Pt medicated with antibiotic for lower lobe PN and now resting comfortably with family at bedside. No needs expressed at this time.

## 2017-04-07 ENCOUNTER — HOSPITAL ENCOUNTER (OUTPATIENT)
Dept: NUCLEAR MEDICINE | Age: 52
Discharge: HOME | End: 2017-04-07
Attending: INTERNAL MEDICINE
Payer: COMMERCIAL

## 2017-04-07 DIAGNOSIS — E04.9 GOITER: ICD-10-CM

## 2017-04-07 DIAGNOSIS — E05.90 HYPERTHYROIDISM: ICD-10-CM

## 2017-04-07 PROCEDURE — A9517 I131 IODIDE CAP, RX: HCPCS

## 2017-04-09 ENCOUNTER — TELEPHONE (OUTPATIENT)
Dept: ENDOCRINOLOGY | Age: 52
End: 2017-04-09

## 2017-04-10 NOTE — TELEPHONE ENCOUNTER
Received a call from the patient Saturday afternoon. He describes that he had LAKE therapy for hyperthyroidism not too long ago but that there were not issues relating to that. Mostly his concern was related to his blood sugars. He describes that he has been traveling and has been generally eating whatever his kids have been eating. Furthermore he notes that his blood sugars have been persistently elevated and so he had self increased his NPH from 16U BID to 30U BID for the past few days. Notes that this still has not resolved his hyperglycemia. He has still been taking 4 units of his regular insulin with meals. Blood sugar 300-500    Advised the following:   Drink a tall glass of water. Dietary adjustment. Continue NPH 30U BID  Increase R from 4 to 10U TID AC starting with current dose. Advised that if blood sugars do not improve over the coarse of the day, to call me back. Advised him to log his blood sugars so that he can present numbers for further adjustment as needed. Demonstrated his understanding. Earnestine Chavez.  [address] [facility]  [employer]

## 2017-04-20 NOTE — TELEPHONE ENCOUNTER
Malcolm Phillipser,  Please tell Mr Ken Simon that I reviewed Dr Dorota Ulrich' note from about 2 weeks ago. Please see how his glucoses have been. Hopefully he has been eating more wisely. He had radioactive iodine on 4/7/2017. He should have a follow-up scheduled for late June or early July to follow-up thyroid function  Pre-labs : TSH, Free T4, A1c, urine microalbumin/creatinine ratio, CMP.    Thank you

## 2017-04-21 RX ORDER — INDAPAMIDE 2.5 MG/1
2.5 TABLET, FILM COATED ORAL DAILY
Qty: 30 TAB | Refills: 10 | Status: SHIPPED | OUTPATIENT
Start: 2017-04-21 | End: 2017-06-27

## 2017-04-21 NOTE — TELEPHONE ENCOUNTER
I called patient. He has been working to improve his diet. Blood sugars have been 200-300 mostly and he notices they are higher when he eats something he shouldn't. He is taking metformin 1,000 mg BID, NPH insulin 12 units twice daily and regular insulin 16 units with meals. He drives long distances often, he is in [place] now. He experienced swelling in his legs on his drive to [place] and stated this is a new problem for him. Swelling has improved. He checks blood pressure occasionally and stated it is around 181/117. Follow up scheduled in late June. Lab order mailed.

## 2017-04-21 NOTE — TELEPHONE ENCOUNTER
I called patient and read Dr. Earline Dupont recommendation and he voiced understanding. Patient stated he will  indapamide when he returns from [facility].

## 2017-04-21 NOTE — TELEPHONE ENCOUNTER
Please tell him to increase NPH dose to 16 units twice daily. I'll also send indapamide 2.5 mg once daily to his pharmacy to help with blood pressure and swelling. I sent this to local [facility], but you can call it into a pharmacy in [facility] if he needs it while travelling.

## 2017-04-25 ENCOUNTER — OFFICE VISIT (OUTPATIENT)
Dept: ENDOCRINOLOGY | Age: 52
End: 2017-04-25

## 2017-04-25 VITALS
HEIGHT: 74 IN | DIASTOLIC BLOOD PRESSURE: 83 MMHG | HEART RATE: 92 BPM | WEIGHT: 235 LBS | BODY MASS INDEX: 30.16 KG/M2 | SYSTOLIC BLOOD PRESSURE: 130 MMHG

## 2017-04-25 DIAGNOSIS — Z79.4 TYPE 2 DIABETES MELLITUS WITH COMPLICATION, WITH LONG-TERM CURRENT USE OF INSULIN (HCC): Primary | ICD-10-CM

## 2017-04-25 DIAGNOSIS — I10 ESSENTIAL HYPERTENSION: ICD-10-CM

## 2017-04-25 DIAGNOSIS — E11.8 TYPE 2 DIABETES MELLITUS WITH COMPLICATION, WITH LONG-TERM CURRENT USE OF INSULIN (HCC): Primary | ICD-10-CM

## 2017-04-25 DIAGNOSIS — E05.90 HYPERTHYROIDISM: ICD-10-CM

## 2017-04-25 RX ORDER — INSULIN GLARGINE 100 [IU]/ML
INJECTION, SOLUTION SUBCUTANEOUS
Qty: 15 ML | Refills: 10 | Status: SHIPPED | OUTPATIENT
Start: 2017-04-25 | End: 2017-06-02 | Stop reason: SDUPTHER

## 2017-04-25 RX ORDER — BLOOD-GLUCOSE METER
EACH MISCELLANEOUS
Qty: 1 EACH | Refills: 0 | Status: SHIPPED | OUTPATIENT
Start: 2017-04-25 | End: 2021-09-03 | Stop reason: ALTCHOICE

## 2017-04-25 RX ORDER — BLOOD SUGAR DIAGNOSTIC
STRIP MISCELLANEOUS
Qty: 100 STRIP | Refills: 11 | Status: SHIPPED | OUTPATIENT
Start: 2017-04-25 | End: 2021-09-03 | Stop reason: ALTCHOICE

## 2017-04-25 NOTE — PATIENT INSTRUCTIONS
Diabetes:  Continue metformin. - continue NPH 16 units twice daily  - Humalog - continue 16 units with meals    Add Victoza. Start with 0.6 mg daily x 7 days. Increase to 1.2 mg dose in a week. When you increase to the 1.2 mg dose, this will lower your blood sugars some and may cause nausea. When you increase Victoza to 1.2 mg dose, STOP regular insulin and START Lantus 30 units daily and STOP NPH insulin.      Hyperthyroidism and enlarged thyroid   Repeat labs in late June for thyroid to see how radioactive iodine worked

## 2017-04-25 NOTE — PROGRESS NOTES
History of Present Illness: Los Dozier is a 45 y.o. male presents for follow-up of hyperthyroidism and diabetes. He has had diabetes for 20 years, since 1995. Diabetes related complications:  + microalbuminuria  No known retinopathy  No sx of nephropathy    Current diabetes regimen:  NPH  16 units twice daily  Regular 16 units breakfast, lunch, night. Glucoses:  Was much higher when in [address] [place] [address]. Now back in 100s. Diet:  Trying to watch carb intake. Diet was poor in [place] which lead to very high glucoses. Now better. Wife is getting him to eat more salads. Blood pressure: lisinopril 40 mg daily. - had edema and HTN since last visit. Called when in [place]. Indapamide 2.5 mg daily started 4 days ago. BP much better and edema has gone down. Lipids: No statin therapy currently. Has Graves disease. Started on methimazole in early August 2016. Started on methimazole, then lost to follow-up and he stopped. Reports his enlarged thyroid dates back to late 1990s. He was having, problems swallowing, some sleeping troubles and noted neck size of shirt has increased from 18\" to 19\" over last few years. He chose to have LAKE over surgery. He is s/p LAKE on 4/7/2017  Feels left lobe is smaller, but disappointed that right side is not smaller yet. Social:  Recently . Wife accompanies. Past Medical History:   Diagnosis Date    Albuminuria     Hypertension     Type 2 diabetes mellitus (HCC)     1995     Current Outpatient Prescriptions   Medication Sig    indapamide (LOZOL) 2.5 mg tablet Take 1 Tab by mouth daily.  lisinopril (PRINIVIL, ZESTRIL) 40 mg tablet Take 40 mg by mouth daily.  metFORMIN (GLUCOPHAGE) 1,000 mg tablet Take 1 Tab by mouth two (2) times daily (with meals).  insulin regular (NOVOLIN R, HUMULIN R) 100 unit/mL injection 4-10 units with meals.     insulin NPH (NOVOLIN N, HUMULIN N) 100 unit/mL injection 16 Units by SubCUTAneous route every twelve (12) hours. (Patient taking differently: 16 Units by SubCUTAneous route every twelve (12) hours. Indications: PAP medication)    aspirin delayed-release (ASPIR-LOW) 81 mg tablet Take 81 mg by mouth daily.  methimazole (TAPAZOLE) 10 mg tablet Take 2 Tabs by mouth daily. For hyperthyroidism (Patient taking differently: Take 10 mg by mouth daily. For hyperthyroidism)     No current facility-administered medications for this visit. Allergies   Allergen Reactions    Penicillins Hives       Review of Systems:  - Eyes: no blurry vision or double vision  - Cardiovascular: no chest pain  - Respiratory: no shortness of breath  - Musculoskeletal: no myalgias  - Neurological: no numbness/tingling in extremities    Physical Examination:  Visit Vitals    /83    Pulse 92    Ht 6' 2\" (1.88 m)    Wt 235 lb (106.6 kg)    BMI 30.17 kg/m2   -   - General: pleasant, no distress, normal gait   HEENT: hearing intact, EOMI, clear sclera without icterus  - Neck: left lobe is palpable and mildly enlarged.  Right lobe remains prominent, thick and about 5 cm high  - Cardiovascular: regular, normal rate   - Respiratory: normal effort  - Integumentary: no edema  - Psychiatric: normal mood and affect    Data Reviewed:   Component      Latest Ref Rng & Units 1/23/2017 1/23/2017 1/23/2017 1/23/2017          10:19 AM 10:19 AM 10:19 AM 10:19 AM   Glucose      65 - 99 mg/dL       BUN      6 - 24 mg/dL       Creatinine      0.76 - 1.27 mg/dL       GFR est non-AA      >59 mL/min/1.73       GFR est AA      >59 mL/min/1.73       BUN/Creatinine ratio      9 - 20       Sodium      134 - 144 mmol/L       Potassium      3.5 - 5.2 mmol/L       Chloride      96 - 106 mmol/L       CO2      18 - 29 mmol/L       Calcium      8.7 - 10.2 mg/dL       Protein, total      6.0 - 8.5 g/dL       Albumin      3.5 - 5.5 g/dL       GLOBULIN, TOTAL      1.5 - 4.5 g/dL       A-G Ratio      1.1 - 2.5       Bilirubin, total 0.0 - 1.2 mg/dL       Alk. phosphatase      39 - 117 IU/L       AST      0 - 40 IU/L       ALT      0 - 44 IU/L       Cholesterol, total      100 - 199 mg/dL   183    Triglyceride      0 - 149 mg/dL   96    HDL Cholesterol      >39 mg/dL   56    VLDL, calculated      5 - 40 mg/dL   19    LDL, calculated      0 - 99 mg/dL   108 (H)    Hemoglobin A1c, (calculated)      4.8 - 5.6 %    8.3 (H)   Estimated average glucose      mg/dL    192   TSH      0.450 - 4.500 uIU/mL  <0.006 (L)     T4, Free      0.82 - 1.77 ng/dL 1.58        Component      Latest Ref Rng & Units 1/23/2017          10:19 AM   Glucose      65 - 99 mg/dL 85   BUN      6 - 24 mg/dL 22   Creatinine      0.76 - 1.27 mg/dL 1.41 (H)   GFR est non-AA      >59 mL/min/1.73 57 (L)   GFR est AA      >59 mL/min/1.73 66   BUN/Creatinine ratio      9 - 20 16   Sodium      134 - 144 mmol/L 137   Potassium      3.5 - 5.2 mmol/L 4.7   Chloride      96 - 106 mmol/L 100   CO2      18 - 29 mmol/L 22   Calcium      8.7 - 10.2 mg/dL 9.1   Protein, total      6.0 - 8.5 g/dL 6.9   Albumin      3.5 - 5.5 g/dL 3.8   GLOBULIN, TOTAL      1.5 - 4.5 g/dL 3.1   A-G Ratio      1.1 - 2.5 1.2   Bilirubin, total      0.0 - 1.2 mg/dL 0.3   Alk. phosphatase      39 - 117 IU/L 139 (H)   AST      0 - 40 IU/L 29   ALT      0 - 44 IU/L 30   Cholesterol, total      100 - 199 mg/dL    Triglyceride      0 - 149 mg/dL    HDL Cholesterol      >39 mg/dL    VLDL, calculated      5 - 40 mg/dL    LDL, calculated      0 - 99 mg/dL    Hemoglobin A1c, (calculated)      4.8 - 5.6 %    Estimated average glucose      mg/dL    TSH      0.450 - 4.500 uIU/mL    T4, Free      0.82 - 1.77 ng/dL      Assessment/Plan:   1. Type 2 diabetes mellitus with complication, with long-term current use of insulin (HCC)   - control is very dietary dependent.  - discussed changing to easier, perhaps more optimal regimen. Currently giving himself 5 injections per day. Discussed adding Victoza to replace mealtime injections. He was concerned about what he had heard from plaintiff attorneys on TV about new diabetes medications. Reviewed LEADER trial for Victoza, highlighting how CV death and overall adverse effects were more common with placebo  - start Lantus 30 units daily once Victoza increased to 1.2 mg  - start Victoza and increased to 1.2 mg in week   2. Hyperthyroidism   - s/p LAKE earlier this month  - reviewed expectations for gland to decrease in size gradually over 3-12 months. - will need to repeat labs in about 2-3 months, about 10-12 weeks after LAKE. May need levothyroxine   3. Essential hypertension   - controlled. Continue lisinopril and indapamide 2.5 mg daily   4. BMI 30.0-30.9,adult    Greater than 50% of 40 minute visit was spent counseling the patient about above. Patient Instructions   Diabetes:  Continue metformin. - continue NPH 16 units twice daily  - Humalog - continue 16 units with meals    Add Victoza. Start with 0.6 mg daily x 7 days. Increase to 1.2 mg dose in a week. When you increase to the 1.2 mg dose, this will lower your blood sugars some and may cause nausea. When you increase Victoza to 1.2 mg dose, STOP regular insulin and START Lantus 30 units daily and STOP NPH insulin. Hyperthyroidism and enlarged thyroid   Repeat labs in late June for thyroid to see how radioactive iodine worked                Follow-up Disposition:  Return in about 10 weeks (around 7/4/2017).     Copy sent to:

## 2017-04-25 NOTE — PROGRESS NOTES
Patient complains of feet swelling and headache. He stated his thyroid feels swollen. He expected his thyroid to decrease in size after the LAKE treatment.

## 2017-06-02 DIAGNOSIS — I10 ESSENTIAL HYPERTENSION WITH GOAL BLOOD PRESSURE LESS THAN 130/85: ICD-10-CM

## 2017-06-02 RX ORDER — INSULIN GLARGINE 100 [IU]/ML
INJECTION, SOLUTION SUBCUTANEOUS
Qty: 15 ML | Refills: 10 | Status: SHIPPED | OUTPATIENT
Start: 2017-06-02 | End: 2017-06-27 | Stop reason: SDUPTHER

## 2017-06-02 RX ORDER — LISINOPRIL 40 MG/1
40 TABLET ORAL DAILY
Qty: 30 TAB | Refills: 10 | Status: SHIPPED | OUTPATIENT
Start: 2017-06-02 | End: 2017-06-14 | Stop reason: SDUPTHER

## 2017-06-02 NOTE — TELEPHONE ENCOUNTER
6/2/2017  3:11 PM      Mr. Amie Clark called stating that he need a refill and a prior authorization.     Refill  -lisinopril (PRINIVIL, ZESTRIL) 40 mg tablet    Prior Auth  -Basaglar 100ml unit  -Humalog Quick Pen 100ml unit      Please send to [id number] - [place], [place] - [address]    Thanks

## 2017-06-05 RX ORDER — LISINOPRIL 40 MG/1
TABLET ORAL
Qty: 90 TAB | Refills: 0 | OUTPATIENT
Start: 2017-06-05

## 2017-06-14 RX ORDER — LISINOPRIL 40 MG/1
40 TABLET ORAL DAILY
Qty: 30 TAB | Refills: 10 | Status: SHIPPED | OUTPATIENT
Start: 2017-06-14 | End: 2020-06-11 | Stop reason: SDUPTHER

## 2017-06-14 NOTE — TELEPHONE ENCOUNTER
----- Message from [facility] sent at 6/14/2017  9:52 AM EDT -----  Regarding: Dr. Troy Brownlee  Pt spouse Page Duke states pt needs a refill on lisinopril 40mg. Pt is completely out of the medication. Pt can be reached at [phone].

## 2017-06-27 ENCOUNTER — OFFICE VISIT (OUTPATIENT)
Dept: ENDOCRINOLOGY | Age: 52
End: 2017-06-27

## 2017-06-27 VITALS
BODY MASS INDEX: 25.03 KG/M2 | WEIGHT: 195 LBS | HEIGHT: 74 IN | HEART RATE: 104 BPM | SYSTOLIC BLOOD PRESSURE: 87 MMHG | DIASTOLIC BLOOD PRESSURE: 56 MMHG

## 2017-06-27 DIAGNOSIS — Z79.4 TYPE 2 DIABETES MELLITUS WITH COMPLICATION, WITH LONG-TERM CURRENT USE OF INSULIN (HCC): ICD-10-CM

## 2017-06-27 DIAGNOSIS — R63.4 WEIGHT LOSS: ICD-10-CM

## 2017-06-27 DIAGNOSIS — E05.90 HYPERTHYROIDISM: Primary | ICD-10-CM

## 2017-06-27 DIAGNOSIS — E11.8 TYPE 2 DIABETES MELLITUS WITH COMPLICATION, WITH LONG-TERM CURRENT USE OF INSULIN (HCC): ICD-10-CM

## 2017-06-27 RX ORDER — METHIMAZOLE 10 MG/1
20 TABLET ORAL 2 TIMES DAILY
Qty: 120 TAB | Refills: 4 | Status: SHIPPED | OUTPATIENT
Start: 2017-06-27 | End: 2020-06-11 | Stop reason: ALTCHOICE

## 2017-06-27 RX ORDER — INSULIN GLARGINE 100 [IU]/ML
50 INJECTION, SOLUTION SUBCUTANEOUS DAILY
Qty: 15 ML | Refills: 10 | Status: SHIPPED | OUTPATIENT
Start: 2017-06-27 | End: 2020-06-11 | Stop reason: SDUPTHER

## 2017-06-27 RX ORDER — LISINOPRIL 40 MG/1
40 TABLET ORAL DAILY
Qty: 30 TAB | Refills: 10 | OUTPATIENT
Start: 2017-06-27

## 2017-06-27 NOTE — PROGRESS NOTES
Patient complains of weight loss, nausea, vomiting, dizziness, shortness of breath that started 1 month ago.

## 2017-06-27 NOTE — PATIENT INSTRUCTIONS
Diabetes:  Continue metformin. Basaglar - increase to 50 units daily    Regular - 10 units with meals that have starch      Regular for high glucoses can take as frequently as every 6 hours. 201-250: take 3 units  251-300: take 6 units  301-350: take 9 units  351+     : take 12 units        Hyperthyroidism   - labs today  - resume methimazole 20 mg twice daily - adjust after labs  Radioactive iodine may need more time to work. Will follow labs closely over next few months.

## 2017-06-27 NOTE — MR AVS SNAPSHOT
Visit Information Date & Time Provider Department Dept. Phone Encounter #  
 6/27/2017  3:50 PM Frances Hernandez, [address] [place] [address] Diabetes and Endocrinology [id number] Your Appointments 7/18/2017 12:10 PM  
ROUTINE CARE with MD Jm Dee Diabetes and Endocrinology [facility]) Appt Note: f/u diabetes cp40.00  
 [address]  
[address] [facility] [address] [id number] Upcoming Health Maintenance Date Due  
 EYE EXAM RETINAL OR DILATED Q1 11/4/1974 Pneumococcal 19-64 Medium Risk (1 of 1 - PPSV23) 11/4/1983 DTaP/Tdap/Td series (1 - Tdap) 5/17/2010 FOBT Q 1 YEAR AGE 50-75 11/4/2014 FOOT EXAM Q1 5/17/2017 MICROALBUMIN Q1 7/19/2017 HEMOGLOBIN A1C Q6M 7/23/2017 INFLUENZA AGE 9 TO ADULT 8/1/2017 LIPID PANEL Q1 1/23/2018 Allergies as of 6/27/2017  Review Complete On: 6/27/2017 By: Frances Hernandez MD  
  
 Severity Noted Reaction Type Reactions Penicillins  05/15/2010    Hives Current Immunizations  Never Reviewed Name Date  
 TD Vaccine 5/16/2010  2:00 AM  
  
 Not reviewed this visit You Were Diagnosed With   
  
 Codes Comments Type 2 diabetes mellitus with complication, with long-term current use of insulin (HCC)    -  Primary ICD-10-CM: E11.8, Z79.4 ICD-9-CM: 250.90, V58.67 Hyperthyroidism     ICD-10-CM: E05.90 ICD-9-CM: 242.90 Weight loss     ICD-10-CM: R63.4 ICD-9-CM: 783.21 Vitals BP Pulse Height(growth percentile) Weight(growth percentile) BMI Smoking Status (!) 87/56 (BP 1 Location: Left arm) (!) 104 6' 2\" (1.88 m) 195 lb (88.5 kg) 25.04 kg/m2 Never Smoker Vitals History BMI and BSA Data Body Mass Index Body Surface Area 25.04 kg/m 2 2.15 m 2 Preferred Pharmacy Pharmacy Name Phone [facility] [address] Your Updated Medication List  
  
   
This list is accurate as of: 17  4:41 PM.  Always use your most recent med list.  
  
  
  
  
 ASPIR-LOW 81 mg tablet Generic drug:  aspirin delayed-release Take 81 mg by mouth daily. insulin glargine 100 unit/mL (3 mL) Inpn Commonly known as:  BASAGLAR KWIKPEN  
50 Units by SubCUTAneous route daily. insulin regular 100 unit/mL injection Commonly known as:  NOVOLIN R, HUMULIN R  
4-10 units with meals. lisinopril 40 mg tablet Commonly known as:  Dulcie Mcburney Take 1 Tab by mouth daily. methIMAzole 10 mg tablet Commonly known as:  TAPAZOLE Take 2 Tabs by mouth two (2) times a day. Mark Gage FLEX Misc Generic drug:  Blood-Glucose Meter 3 times daily. ONETOUCH VERIO strip Generic drug:  glucose blood VI test strips 3 times daily Prescriptions Sent to Pharmacy Refills  
 methIMAzole (TAPAZOLE) 10 mg tablet 4 Sig: Take 2 Tabs by mouth two (2) times a day. Class: Normal  
 Pharmacy: [place] [address] Ph #: [phone] Route: Oral  
 insulin glargine (BASAGLAR KWIKPEN) 100 unit/mL (3 mL) inpn 10 Si Units by SubCUTAneous route daily. Class: Normal  
 Pharmacy: [place] [id number] Ph #: [phone] Route: SubCUTAneous We Performed the Following C-PEPTIDE [id number] CPT(R)] HEMOGLOBIN A1C WITH EAG [29762 CPT(R)] METABOLIC PANEL, COMPREHENSIVE [22520 CPT(R)] T4, FREE [id number] CPT(R)] TSH 3RD GENERATION [65044 CPT(R)] Patient Instructions Diabetes: 
Continue metformin. Basaglar - increase to 50 units daily Regular - 10 units with meals that have starch Regular for high glucoses can take as frequently as every 6 hours. 201-250: take 3 units 251-300: take 6 units 301-350: take 9 units 351+     : take 12 units Hyperthyroidism - labs today 
- resume methimazole 20 mg twice daily - adjust after labs Radioactive iodine may need more time to work. Will follow labs closely over next few months. Introducing [facility] & [facility]! Dear Aston Villaseñor: Thank you for requesting a [employer] account. Our records indicate that you already have an active [employer] account. You can access your account anytime at [email or web address] [email or web address] Remember Did you know that you can access your hospital and ER discharge instructions at any time in [employer]? You can also review all of your test results from your hospital stay or ER visit. Additional Information If you have questions, please visit the Frequently Asked Questions section of the [employer] website at [email or web address] Remember, [employer] is NOT to be used for urgent needs. For medical emergencies, dial 911. Now available from your iPhone and Android! Please provide this summary of care documentation to your next provider. Your primary care clinician is listed as Shira Olszewski. If you have any questions after today's visit, please call [phone].

## 2017-06-27 NOTE — PROGRESS NOTES
History of Present Illness: [facility]. is a 45 y.o. male presents for follow-up of diabetes type II and thyroidism. He comes for an earlier than scheduled visit today. He is status post radio active iodine for Graves' disease on 4/7/2017. He had an asymmetric thyroid gland. Since the radioactive iodine treatment, he has not been doing well.  ++ Wt down 40 lbs in last 2 months   He also reports creased heat intolerance. Reports trouble falling asleep. Palpitations, and particularly notes these at night when he tries to fall asleep. He denies any change in bowel habits. Of note, nausea and vomiting for the last several weeks. Appetite is very poor. Diabetes:   He has had diabetes  since 1995. Diabetes related complications:  + microalbuminuria  No known retinopathy  No sx of nephropathy    Current diabetes regimen:  Basaglar 40 units daily  Taking regular 10 units before meals. Of late, he is only taking this once daily as he is only eating 1 meal per day    Victoza-stopped taking this earlier this month. He is also not taking metformin. Glucoses:  Checked 6 times over last 30 days. Avg 342  538 after lunch today. Diet:  Eating very small portions. Had 1/2 burger and potato today. Blood pressure: lisinopril 40 mg daily. Stopped indapamide  BP very low today     Lipids: No statin therapy currently. Social:  Recently . Wife accompanies. Past Medical History:   Diagnosis Date    Albuminuria     Hypertension     Type 2 diabetes mellitus (HCC)     1995     Current Outpatient Prescriptions   Medication Sig    lisinopril (PRINIVIL, ZESTRIL) 40 mg tablet Take 1 Tab by mouth daily.  insulin glargine (BASAGLAR KWIKPEN) 100 unit/mL (3 mL) inpn 30 units daily.  Liraglutide (VICTOZA 2-ANSON) 0.6 mg/0.1 mL (18 mg/3 mL) sub-q pen 0.2 mL by SubCUTAneous route daily. 1.2 mg daily    ONETOUCH VERIO strip 3 times daily    ONETOUCH VERIO FLEX misc 3 times daily.     insulin regular (NOVOLIN R, HUMULIN R) 100 unit/mL injection 4-10 units with meals.  aspirin delayed-release (ASPIR-LOW) 81 mg tablet Take 81 mg by mouth daily.  indapamide (LOZOL) 2.5 mg tablet Take 1 Tab by mouth daily.  metFORMIN (GLUCOPHAGE) 1,000 mg tablet Take 1 Tab by mouth two (2) times daily (with meals). No current facility-administered medications for this visit. Allergies   Allergen Reactions    Penicillins Hives       Review of Systems:  - Eyes: no blurry vision or double vision  - Cardiovascular: See HPI  - Respiratory: no shortness of breath  - Musculoskeletal: Positive muscle weakness  - Neurological: no numbness/tingling in extremities    Physical Examination:  Visit Vitals    BP (!) 87/56 (BP 1 Location: Left arm)    Pulse (!) 104    Ht 6' 2\" (1.88 m)    Wt 195 lb (88.5 kg)    BMI 25.04 kg/m2   -   - General: pleasant, no distress, normal gait   HEENT: hearing intact, EOMI, clear sclera without icterus  - Neck: Right thyromegaly hard to assess change in size considering his substantial weight loss.   Is stable in size  - Cardiovascular: regular, normal rate   - Respiratory: normal effort  - Integumentary: no edema  - Psychiatric: normal mood and affect    Data Reviewed:   Component      Latest Ref Rng & Units 4/3/2017 1/23/2017 1/23/2017 1/23/2017           6:27 PM 10:19 AM 10:19 AM 10:19 AM   Sodium      136 - 145 mmol/L 130 (L)      Potassium      3.5 - 5.1 mmol/L 4.1      Chloride      97 - 108 mmol/L 95 (L)      CO2      21 - 32 mmol/L 27      Anion gap      5 - 15 mmol/L 8      Glucose      65 - 100 mg/dL 347 (H)      BUN      6 - 20 MG/DL 29 (H)      Creatinine      0.70 - 1.30 MG/DL 2.22 (H)      BUN/Creatinine ratio      12 - 20   13      GFR est AA      >60 ml/min/1.73m2 38 (L)      GFR est non-AA      >60 ml/min/1.73m2 31 (L)      Calcium      8.5 - 10.1 MG/DL 9.0      Bilirubin, total      0.2 - 1.0 MG/DL 0.4      ALT (SGPT)      12 - 78 U/L 13      AST      15 - 37 U/L 5 (L)      Alk. phosphatase      45 - 117 U/L 146 (H)      Protein, total      6.4 - 8.2 g/dL 7.7      Albumin      3.5 - 5.0 g/dL 3.3 (L)      Globulin      2.0 - 4.0 g/dL 4.4 (H)      A-G Ratio      1.1 - 2.2   0.8 (L)      Cholesterol, total      100 - 199 mg/dL    183   Triglyceride      0 - 149 mg/dL    96   HDL Cholesterol      >39 mg/dL    56   VLDL, calculated      5 - 40 mg/dL    19   LDL, calculated      0 - 99 mg/dL    108 (H)   Hemoglobin A1c, (calculated)      4.8 - 5.6 %       Estimated average glucose      mg/dL       TSH      0.450 - 4.500 uIU/mL   <0.006 (L)    T4, Free      0.82 - 1.77 ng/dL  1.58       Component      Latest Ref Rng & Units 1/23/2017          10:19 AM   Sodium      136 - 145 mmol/L    Potassium      3.5 - 5.1 mmol/L    Chloride      97 - 108 mmol/L    CO2      21 - 32 mmol/L    Anion gap      5 - 15 mmol/L    Glucose      65 - 100 mg/dL    BUN      6 - 20 MG/DL    Creatinine      0.70 - 1.30 MG/DL    BUN/Creatinine ratio      12 - 20      GFR est AA      >60 ml/min/1.73m2    GFR est non-AA      >60 ml/min/1.73m2    Calcium      8.5 - 10.1 MG/DL    Bilirubin, total      0.2 - 1.0 MG/DL    ALT (SGPT)      12 - 78 U/L    AST      15 - 37 U/L    Alk. phosphatase      45 - 117 U/L    Protein, total      6.4 - 8.2 g/dL    Albumin      3.5 - 5.0 g/dL    Globulin      2.0 - 4.0 g/dL    A-G Ratio      1.1 - 2.2      Cholesterol, total      100 - 199 mg/dL    Triglyceride      0 - 149 mg/dL    HDL Cholesterol      >39 mg/dL    VLDL, calculated      5 - 40 mg/dL    LDL, calculated      0 - 99 mg/dL    Hemoglobin A1c, (calculated)      4.8 - 5.6 % 8.3 (H)   Estimated average glucose      mg/dL 192   TSH      0.450 - 4.500 uIU/mL    T4, Free      0.82 - 1.77 ng/dL        Assessment/Plan:   1. Hyperthyroidism   He is status post radioactive iodine on 4/7/2017. Weeks since treatment. Check labs today. I suspect he may be hyperthyroid currently, due to his reported weight loss.   Until labs return, will have him take methimazole 20 mg twice daily. 2. Type 2 diabetes mellitus with complication, with long-term current use of insulin ([address]   Not controlled. Possible that insulin needs are considerably higher due to inadequately treated hyperthyroidism. Crease Basaglar to 50 units once daily. Add regular insulin for hyperglycemia. 3 units: 50 mg/dL for values greater than 200    3. Weight loss   -Suspect this is due to a combination of hyperthyroidism, poorly controlled diabetes, poor appetite. Patient Instructions   Diabetes:  Continue metformin. Basaglar - increase to 50 units daily    Regular - 10 units with meals that have starch      Regular for high glucoses can take as frequently as every 6 hours. 201-250: take 3 units  251-300: take 6 units  301-350: take 9 units  351+     : take 12 units        Hyperthyroidism   - labs today  - resume methimazole 20 mg twice daily - adjust after labs  Radioactive iodine may need more time to work. Will follow labs closely over next few months. Follow-up Disposition:  Return for As scheduled.

## 2017-06-28 ENCOUNTER — TELEPHONE (OUTPATIENT)
Dept: ENDOCRINOLOGY | Age: 52
End: 2017-06-28

## 2017-06-28 LAB
ALBUMIN SERPL-MCNC: 3.6 G/DL (ref 3.5–5.5)
ALBUMIN/GLOB SERPL: 1 {RATIO} (ref 1.2–2.2)
ALP SERPL-CCNC: 272 IU/L (ref 39–117)
ALT SERPL-CCNC: 188 IU/L (ref 0–44)
AST SERPL-CCNC: 147 IU/L (ref 0–40)
BILIRUB SERPL-MCNC: 0.5 MG/DL (ref 0–1.2)
BUN SERPL-MCNC: 46 MG/DL (ref 6–24)
BUN/CREAT SERPL: 30 (ref 9–20)
C PEPTIDE SERPL-MCNC: 7.7 NG/ML (ref 1.1–4.4)
CALCIUM SERPL-MCNC: 9.7 MG/DL (ref 8.7–10.2)
CHLORIDE SERPL-SCNC: 91 MMOL/L (ref 96–106)
CO2 SERPL-SCNC: 20 MMOL/L (ref 18–29)
CREAT SERPL-MCNC: 1.53 MG/DL (ref 0.76–1.27)
EST. AVERAGE GLUCOSE BLD GHB EST-MCNC: 263 MG/DL
GLOBULIN SER CALC-MCNC: 3.5 G/DL (ref 1.5–4.5)
GLUCOSE SERPL-MCNC: 503 MG/DL (ref 65–99)
HBA1C MFR BLD: 10.8 % (ref 4.8–5.6)
POTASSIUM SERPL-SCNC: 5.8 MMOL/L (ref 3.5–5.2)
PROT SERPL-MCNC: 7.1 G/DL (ref 6–8.5)
SODIUM SERPL-SCNC: 128 MMOL/L (ref 134–144)
T4 FREE SERPL-MCNC: 5.74 NG/DL (ref 0.82–1.77)
TSH SERPL DL<=0.005 MIU/L-ACNC: <0.006 UIU/ML (ref 0.45–4.5)

## 2017-06-30 DIAGNOSIS — R74.8 LIVER ENZYME ELEVATION: Primary | ICD-10-CM

## 2017-06-30 DIAGNOSIS — E05.90 HYPERTHYROIDISM: ICD-10-CM

## 2017-06-30 RX ORDER — PIOGLITAZONEHYDROCHLORIDE 30 MG/1
30 TABLET ORAL DAILY
Qty: 30 TAB | Refills: 11 | Status: SHIPPED | OUTPATIENT
Start: 2017-06-30 | End: 2018-07-26 | Stop reason: SDUPTHER

## 2017-06-30 NOTE — TELEPHONE ENCOUNTER
I called Mr. Teena Basilio this evening around 6 PM.  We discussed the results of his lab tests    He is very hyperthyroid. Methimazole 20 mg twice daily started at office visit. Diabetes poorly controlled. C-peptide was substantially elevated. We will start pioglitazone 30 mg daily. I suspect treating severe hyperthyroidism will help greatly. Liver enzymes were also substantially abnormal.  This is likely due to the hyperthyroidism as well. We will mail labs to be repeated in 2 weeks to reassess thyroid and liver function tests. Mr. Teena Basilio was called with labs, impressions and recommendations provided. He was traveling for a  and was in [place]. He reports he will return on . He reports being fatigued and tiring easily. He has started the higher dose of methimazole. I [facility] he [facility] He was not yet to his destination and did not know which [facility] would be close to where he was to be staying. He reports his glucoses are still high, but they sound somewhat improved. He described the lowest value being 198 with much of the value still being in the 200s and 300s. He is taking a regularl insulin twice daily for high glucoses. Also, he did increase the Basaglar as directed. I told him that I would be out of town next week but that my colleagues would be in the office to help him if he would need it. I am concerned about possible cardiac dysfunction due to the severity of the hyperthyroidism. Blood pressure is too low to tolerate beta blockers at this time. Hopefully, he will start to feel better and have more energy with improvement of the hyperthyroidism and the hyperglycemia.

## 2017-06-30 NOTE — PROGRESS NOTES
Very hyperthyroid. Methimazole 20 mg twice daily started at office visit. Diabetes poorly controlled. C-peptide was substantially elevated. We will start pioglitazone 30 mg daily. I suspect treating severe hyperthyroidism will help greatly. Liver enzymes were also substantially abnormal.  This is likely due to the hyperthyroidism as well. We will mail labs to be repeated in 2 weeks to reassess thyroid and liver function tests. Mr. Haley Ronquillo was called with labs, impressions and recommendations. [facility],  Please mail lab letter (no need to call).   Jodi Garsia

## 2017-07-13 ENCOUNTER — OP HISTORICAL/CONVERTED ENCOUNTER (OUTPATIENT)
Dept: OTHER | Age: 52
End: 2017-07-13

## 2018-07-27 RX ORDER — PIOGLITAZONEHYDROCHLORIDE 30 MG/1
30 TABLET ORAL DAILY
Qty: 30 TAB | Refills: 3 | Status: SHIPPED | OUTPATIENT
Start: 2018-07-27 | End: 2019-08-22 | Stop reason: SDUPTHER

## 2018-12-11 NOTE — MR AVS SNAPSHOT
Visit Information Date & Time Provider Department Dept. Phone Encounter #  
 4/25/2017  9:50 AM Mimi Frederick MD [facility] [phone] [id number] Follow-up Instructions Return in about 10 weeks (around 7/4/2017). Upcoming Health Maintenance Date Due  
 EYE EXAM RETINAL OR DILATED Q1 11/4/1974 Pneumococcal 19-64 Medium Risk (1 of 1 - PPSV23) 11/4/1983 DTaP/Tdap/Td series (1 - Tdap) 5/17/2010 FOBT Q 1 YEAR AGE 50-75 11/4/2014 INFLUENZA AGE 9 TO ADULT 8/1/2016 FOOT EXAM Q1 5/17/2017 MICROALBUMIN Q1 7/19/2017 HEMOGLOBIN A1C Q6M 7/23/2017 LIPID PANEL Q1 1/23/2018 Allergies as of 4/25/2017  Review Complete On: 4/25/2017 By: Mimi Frederick MD  
  
 Severity Noted Reaction Type Reactions Penicillins  05/15/2010    Hives Current Immunizations  Never Reviewed Name Date  
 TD Vaccine 5/16/2010  2:00 AM  
  
 Not reviewed this visit You Were Diagnosed With   
  
 Codes Comments Type 2 diabetes mellitus with complication, with long-term current use of insulin (HCC)    -  Primary ICD-10-CM: E11.8, Z79.4 ICD-9-CM: 250.90, V58.67 Hyperthyroidism     ICD-10-CM: E05.90 ICD-9-CM: 242.90 Essential hypertension     ICD-10-CM: I10 
ICD-9-CM: 401.9 BMI 30.0-30.9,adult     ICD-10-CM: Z68.30 ICD-9-CM: V85.30 Vitals BP Pulse Height(growth percentile) Weight(growth percentile) BMI Smoking Status 130/83 92 6' 2\" (1.88 m) 235 lb (106.6 kg) 30.17 kg/m2 Never Smoker Vitals History BMI and BSA Data Body Mass Index Body Surface Area  
 30.17 kg/m 2 2.36 m 2 Preferred Pharmacy Pharmacy Name Phone [facility] [address] Your Updated Medication List  
  
   
This list is accurate as of: 4/25/17 11:14 AM.  Always use your most recent med list.  
  
  
  
  
 ASPIR-LOW 81 mg tablet Generic drug:  aspirin delayed-release Take 81 mg by mouth daily. indapamide 2.5 mg tablet Commonly known as:  Elroy Majors Take 1 Tab by mouth daily. insulin glargine 100 unit/mL (3 mL) pen Commonly known as:  LANTUS SOLOSTAR  
30 units once daily subcutaneously  
  
 insulin regular 100 unit/mL injection Commonly known as:  NOVOLIN R, HUMULIN R  
4-10 units with meals. Liraglutide 0.6 mg/0.1 mL (18 mg/3 mL) sub-q pen Commonly known as:  VICTOZA 2-ANSON  
0.2 mL by SubCUTAneous route daily. 1.2 mg daily  
  
 lisinopril 40 mg tablet Commonly known as:  Marcy Moulder Take 40 mg by mouth daily. metFORMIN 1,000 mg tablet Commonly known as:  GLUCOPHAGE Take 1 Tab by mouth two (2) times daily (with meals). Prescriptions Sent to Pharmacy Refills  
 insulin glargine (LANTUS SOLOSTAR) 100 unit/mL (3 mL) pen 10 Si units once daily subcutaneously Class: Normal  
 Pharmacy: [id number] [place] [address] Ph #: [phone] Liraglutide (VICTOZA 2-ANSON) 0.6 mg/0.1 mL (18 mg/3 mL) sub-q pen 10 Si.2 mL by SubCUTAneous route daily. 1.2 mg daily Class: Normal  
 Pharmacy: [id number] [place] [address] Ph #: [phone] Route: SubCUTAneous Follow-up Instructions Return in about 10 weeks (around 2017). Patient Instructions Diabetes: 
Continue metformin. - continue NPH 16 units twice daily - Humalog - continue 16 units with meals Add Victoza. Start with 0.6 mg daily x 7 days. Increase to 1.2 mg dose in a week. When you increase to the 1.2 mg dose, this will lower your blood sugars some and may cause nausea. When you increase Victoza to 1.2 mg dose, STOP regular insulin and START Lantus 30 units daily and STOP NPH insulin. Hyperthyroidism and enlarged thyroid Repeat labs in late  for thyroid to see how radioactive iodine worked Introducing [facility] & [facility]! Dear Edwar Leach: Thank you for requesting a [employer] account. Our records indicate that you already have an active [employer] account. You can access your account anytime at [email or web address] Did you know that you can access your hospital and ER discharge instructions at any time in [employer]? You can also review all of your test results from your hospital stay or ER visit. Additional Information If you have questions, please visit the Frequently Asked Questions section of the [employer] website at [email or web address] Remember, [employer] is NOT to be used for urgent needs. For medical emergencies, dial 911. Now available from your iPhone and Android! Please provide this summary of care documentation to your next provider. Your primary care clinician is listed as Tk Back. If you have any questions after today's visit, please call [phone]. Dermal Autograft Text: The defect edges were debeveled with a #15 scalpel blade.  Given the location of the defect, shape of the defect and the proximity to free margins a dermal autograft was deemed most appropriate.  Using a sterile surgical marker, the primary defect shape was transferred to the donor site. The area thus outlined was incised deep to adipose tissue with a #15 scalpel blade.  The harvested graft was then trimmed of adipose and epidermal tissue until only dermis was left.  The skin graft was then placed in the primary defect and oriented appropriately.

## 2019-07-16 ENCOUNTER — ED HISTORICAL/CONVERTED ENCOUNTER (OUTPATIENT)
Dept: OTHER | Age: 54
End: 2019-07-16

## 2019-08-22 RX ORDER — PIOGLITAZONEHYDROCHLORIDE 30 MG/1
30 TABLET ORAL DAILY
Qty: 30 TAB | Refills: 3 | Status: SHIPPED | OUTPATIENT
Start: 2019-08-22 | End: 2020-06-11 | Stop reason: SDUPTHER

## 2020-02-27 ENCOUNTER — OP HISTORICAL/CONVERTED ENCOUNTER (OUTPATIENT)
Dept: OTHER | Age: 55
End: 2020-02-27

## 2020-06-11 ENCOUNTER — OFFICE VISIT (OUTPATIENT)
Dept: ENDOCRINOLOGY | Age: 55
End: 2020-06-11

## 2020-06-11 VITALS
WEIGHT: 223.4 LBS | TEMPERATURE: 97.9 F | RESPIRATION RATE: 20 BRPM | HEIGHT: 74 IN | HEART RATE: 88 BPM | SYSTOLIC BLOOD PRESSURE: 124 MMHG | BODY MASS INDEX: 28.67 KG/M2 | OXYGEN SATURATION: 98 % | DIASTOLIC BLOOD PRESSURE: 79 MMHG

## 2020-06-11 DIAGNOSIS — E78.2 MIXED HYPERLIPIDEMIA: ICD-10-CM

## 2020-06-11 DIAGNOSIS — E11.65 UNCONTROLLED TYPE 2 DIABETES MELLITUS WITH HYPERGLYCEMIA (HCC): Primary | ICD-10-CM

## 2020-06-11 DIAGNOSIS — I10 ESSENTIAL HYPERTENSION: ICD-10-CM

## 2020-06-11 DIAGNOSIS — N18.30 STAGE 3 CHRONIC KIDNEY DISEASE (HCC): ICD-10-CM

## 2020-06-11 RX ORDER — SEMAGLUTIDE 1.34 MG/ML
INJECTION, SOLUTION SUBCUTANEOUS
COMMUNITY
End: 2020-06-11 | Stop reason: DRUGHIGH

## 2020-06-11 RX ORDER — SEMAGLUTIDE 1.34 MG/ML
1 INJECTION, SOLUTION SUBCUTANEOUS
Qty: 2 BOX | Refills: 6 | Status: SHIPPED | OUTPATIENT
Start: 2020-06-11 | End: 2020-07-20 | Stop reason: SDUPTHER

## 2020-06-11 RX ORDER — ATORVASTATIN CALCIUM 40 MG/1
TABLET, FILM COATED ORAL DAILY
COMMUNITY
End: 2020-12-23

## 2020-06-11 RX ORDER — LISINOPRIL 20 MG/1
20 TABLET ORAL 2 TIMES DAILY
COMMUNITY
End: 2020-12-11 | Stop reason: SDUPTHER

## 2020-06-11 RX ORDER — PIOGLITAZONEHYDROCHLORIDE 30 MG/1
30 TABLET ORAL DAILY
Qty: 30 TAB | Refills: 6 | Status: SHIPPED | OUTPATIENT
Start: 2020-06-11 | End: 2021-01-25

## 2020-06-11 RX ORDER — FLASH GLUCOSE SCANNING READER
EACH MISCELLANEOUS
Qty: 2 EACH | Refills: 6 | Status: SHIPPED | OUTPATIENT
Start: 2020-06-11

## 2020-06-11 RX ORDER — FLASH GLUCOSE SENSOR
KIT MISCELLANEOUS
Qty: 2 KIT | Refills: 6 | Status: SHIPPED | OUTPATIENT
Start: 2020-06-11 | End: 2020-12-16 | Stop reason: SDUPTHER

## 2020-06-11 RX ORDER — AMLODIPINE BESYLATE 5 MG/1
5 TABLET ORAL DAILY
COMMUNITY
End: 2021-09-02

## 2020-06-11 RX ORDER — INSULIN GLARGINE 100 [IU]/ML
34 INJECTION, SOLUTION SUBCUTANEOUS DAILY
Qty: 15 ML | Refills: 6 | Status: SHIPPED | OUTPATIENT
Start: 2020-06-11 | End: 2020-11-16 | Stop reason: ALTCHOICE

## 2020-06-11 RX ORDER — INSULIN ASPART 100 [IU]/ML
INJECTION, SOLUTION INTRAVENOUS; SUBCUTANEOUS
COMMUNITY
End: 2020-11-16 | Stop reason: ALTCHOICE

## 2020-06-11 RX ORDER — ALLOPURINOL 100 MG/1
TABLET ORAL DAILY
COMMUNITY

## 2020-06-11 RX ORDER — METOPROLOL SUCCINATE 25 MG/1
TABLET, EXTENDED RELEASE ORAL DAILY
COMMUNITY
End: 2021-09-02

## 2020-06-11 RX ORDER — NATEGLINIDE 120 MG/1
120 TABLET ORAL
Qty: 90 TAB | Refills: 6 | Status: SHIPPED | OUTPATIENT
Start: 2020-06-11 | End: 2021-01-25

## 2020-06-11 NOTE — PROGRESS NOTES
HISTORY OF PRESENT ILLNESS  Annie So is a 53 y.o. male. HPI     Referred by PCP   Initial visit for  Thyroid disease   And  Diabetes type 2     Patient here for initial visit of Type 2 diabetes mellitus . H/o diabetes for 25 years     Current A1C is over 11 %   and symptoms/problems include fluctuant sugars     Current diabetic medications include Levemir 34 units at night, Ozempic 1 mg once a week, Actos 30 mg a day and NovoLog which was prescribed as needed    Current monitoring regimen: home blood tests - rarely  Home blood sugar records: trend: fluctuating a lot  Any episodes of hypoglycemia? no    Weight trend: stable  Prior visit with dietician: no  Current diet: well balanced  Current exercise: walking    Known diabetic complications: nephropathy  Cardiovascular risk factors: dyslipidemia, diabetes mellitus, male gender, hypertension, stress    Eye exam current (within one year): no  LILLY: no     Past Medical History:   Diagnosis Date    Albuminuria     Hypertension     Type 2 diabetes mellitus ([facility] [address]     1995     Past Surgical History:   Procedure Laterality Date    HX SEPTOPLASTY       Current Outpatient Medications   Medication Sig    atorvastatin (LIPITOR) 40 mg tablet Take  by mouth daily.  lisinopriL (PRINIVIL, ZESTRIL) 20 mg tablet Take 20 mg by mouth two (2) times a day.  amLODIPine (NORVASC) 5 mg tablet Take 5 mg by mouth daily.  allopurinoL (ZYLOPRIM) 100 mg tablet Take  by mouth daily.  metoprolol succinate (TOPROL-XL) 25 mg XL tablet Take  by mouth daily.  semaglutide (Ozempic) 0.25 mg/0.2 mL (2 mg/1.5 mL) sub-q pen by SubCUTAneous route every seven (7) days.  insulin aspart U-100 (NovoLOG Flexpen U-100 Insulin) 100 unit/mL (3 mL) inpn by SubCUTAneous route. Sliding Scale    insulin glargine (BASAGLAR KWIKPEN) 100 unit/mL (3 mL) inpn 50 Units by SubCUTAneous route daily.  (Patient taking differently: 30 Units by SubCUTAneous route nightly.)    ONETOUCH VERIO strip 3 times daily    ONETOUCH VERIO FLEX misc 3 times daily.  aspirin delayed-release (ASPIR-LOW) 81 mg tablet Take 81 mg by mouth daily.  pioglitazone (ACTOS) 30 mg tablet Take 1 Tab by mouth daily. Last office visit in 6/2017. Follow-up needed for continued refills.  methIMAzole (TAPAZOLE) 10 mg tablet Take 2 Tabs by mouth two (2) times a day.  lisinopril (PRINIVIL, ZESTRIL) 40 mg tablet Take 1 Tab by mouth daily.  insulin regular (NOVOLIN R, HUMULIN R) 100 unit/mL injection 4-10 units with meals. No current facility-administered medications for this visit. Review of Systems   Constitutional: Negative. HENT: Negative. Eyes: Negative for pain and redness. Respiratory: Negative. Cardiovascular: Negative for chest pain, palpitations and leg swelling. Gastrointestinal: Negative. Negative for constipation. Genitourinary: Negative. Musculoskeletal: Negative for myalgias. Skin: Negative. Neurological: Negative. Endo/Heme/Allergies: Negative. Psychiatric/Behavioral: Negative for depression and memory loss. The patient does not have insomnia. Physical Exam  Constitutional:       Appearance: He is well-developed. HENT:      Head: Normocephalic. Eyes:      Conjunctiva/sclera: Conjunctivae normal.      Pupils: Pupils are equal, round, and reactive to light. Neck:      Musculoskeletal: Normal range of motion and neck supple. Cardiovascular:      Rate and Rhythm: Normal rate and regular rhythm. Pulmonary:      Effort: Pulmonary effort is normal.      Breath sounds: Normal breath sounds. Abdominal:      General: Bowel sounds are normal.      Palpations: Abdomen is soft. Musculoskeletal: Normal range of motion. Skin:     General: Skin is warm and dry. Neurological:      Mental Status: He is alert and oriented to person, place, and time. Deep Tendon Reflexes: Reflexes are normal and symmetric.          Reviewed labs from pcp ASSESSMENT and PLAN    1. Type 2 DM,poorly controlled : a1c is over 11 %     Discussed DM 2 patho-physiology extensively     Reviewed the glucose log : occasional sugars  93 to 237,  Mg     He will stay on Levemir 34 units at night but he will try Starlix 120 mg 3 times a day. as initial step  He is informed that this initial step will work well if he has some pancreatic function along with diet control. He also is educated about the next steps which would be starting on [place] and considering Afrezza     He will continue to stay on Ozempic and Actos. I have gone extensively around the medication side effects. I also made him aware of the renal dynamics when it comes to medications and insulin use  I also encouraged him to try for freestyle vivian sensors. Patient is advised about checking blood sugars 4 times a day and maintaining log book. The danger of having low blood sugars has been explained with inappropriate use of insulin  Patient voiced understanding and using the printed instructions at home. Hypoglycemia management has been explained to the patient. lab results and schedule of future lab studies reviewed with patient  specific diabetic recommendations: diabetic diet discussed in detail, written exchange diet given, home glucose monitoring emphasized, home glucose monitoring demonstrated and taught, glucose meter dispensed to patient, all medications, side effects and compliance discussed carefully, use and side effects of insulin is taught, foot care discussed and Podiatry visits discussed, annual eye examinations at Ophthalmology discussed, glycohemoglobin and other lab monitoring discussed and long term diabetic complications discussed    2. Hypoglycemia :  Educated on treating the hypoglycemia. Discussed insulin use and prescribed    3. HTN : continue lisinopril 20 mg bid , toprol xl . Patient is educated about importance of compliance with anti-hypertensives especially ARB/ACEI    4. Dyslipidemia : continue lipitor 40 mg . Patient is educated about benefits and adverse effects of statins and explained how benefits outweigh risk. 5. use of aspirin to prevent MI and TIA's discussed        6. Diabetic complications :     A. Retinopathy-YES   educated on this complication,  regular f/u with ophthalmologist encouraged    B. Nephropathy - YES     Creat 2.3 from may 2020 labs     educated on this disease and indicated stages and its prognosis. Regular care with nephrologist encouraged    C. Peripheral Neuropathy - NO  ,   educated on this disease and indicated improvement with good and stable glycemic control    D. PAD : NO     order LILLY /PVR    E : CAD : NO    F. Erectile dysfunction  :NO        6. Thyroid disease : resplved   S/p LAKE therapy for  Goiter  30 mci  -  LAKE therapy ;  Did not respond for LAKE therapy  Patient says  He is EUTHYROID  Without any help based on recent labs         > 50 % of time is spent on counseling   Patient voiced understanding her plan of care

## 2020-06-11 NOTE — PROGRESS NOTES
1. Have you been to the ER, urgent care clinic since your last? No  Hospitalized since your last visit? No    2. Have you seen or consulted any other health care providers outside of the [address] [place] [address] since your last visit? Include any pap smears or colon screening. No    Wt Readings from Last 3 Encounters:   06/11/20 223 lb 6.4 oz (101.3 kg)   06/27/17 195 lb (88.5 kg)   04/25/17 235 lb (106.6 kg)     Temp Readings from Last 3 Encounters:   06/11/20 97.9 °F (36.6 °C) (Oral)   04/03/17 98.7 °F (37.1 °C)   10/26/16 98.4 °F (36.9 °C) (Oral)     BP Readings from Last 3 Encounters:   06/11/20 124/79   06/27/17 (!) 87/56   04/25/17 130/83     Pulse Readings from Last 3 Encounters:   06/11/20 88   06/27/17 (!) 104   04/25/17 92     Lab Results   Component Value Date/Time    Hemoglobin A1c 10.8 (H) 06/27/2017 04:52 PM     Patient has meter today.

## 2020-06-11 NOTE — PATIENT INSTRUCTIONS
-------------------------------------------------------------------------------------------- Refills    -    please call your pharmacy and have them send us a refill request 
 
Results  -  allow up to a week for lab results to be processed and reviewed. Phone calls  -  Allow upto 24 hrs. for non-urgent calls to be retained Prior authorization - It may take up to 4 weeks to process, depending on your insurance Forms  -  FMLA, DMV, patient assistance, etc. will take up to 2 weeks to process Cancellations - please notify the office in advance if you cannot keep your appointment Samples  - will only be dispensed at visits as supply is limited If you are having a medical emergency call 911 
 
-------------------------------------------------------------------------------------------- [address] [facility] water Do not skip meals Do not eat in between meals Reduce carbs- pasta, rice, potatoes, bread Do not drink juices or sodas, even they are calorie zero or diet drinks Do not eat peanut butter Do not eat sugar free cookies and cakes Do not eat peaches, oranges, pineapples, raisins, grapes , canteloupe , honey dew and fruit medleys 
 
 
 
-------------------------------------------------------------------------------------------------------------------------- Stay on basaglar 34  Units a day Start on starlix 120 mg right before each meal  
 
 
Stay on ozempic    1 mg  a  Week Stay on actos a day

## 2020-07-16 DIAGNOSIS — E78.2 MIXED HYPERLIPIDEMIA: ICD-10-CM

## 2020-07-16 DIAGNOSIS — N18.30 STAGE 3 CHRONIC KIDNEY DISEASE (HCC): ICD-10-CM

## 2020-07-16 DIAGNOSIS — E11.65 UNCONTROLLED TYPE 2 DIABETES MELLITUS WITH HYPERGLYCEMIA (HCC): ICD-10-CM

## 2020-07-16 DIAGNOSIS — I10 ESSENTIAL HYPERTENSION: ICD-10-CM

## 2020-07-20 ENCOUNTER — OFFICE VISIT (OUTPATIENT)
Dept: ENDOCRINOLOGY | Age: 55
End: 2020-07-20

## 2020-07-20 VITALS
TEMPERATURE: 98 F | DIASTOLIC BLOOD PRESSURE: 82 MMHG | SYSTOLIC BLOOD PRESSURE: 124 MMHG | OXYGEN SATURATION: 98 % | RESPIRATION RATE: 19 BRPM | HEIGHT: 74 IN | HEART RATE: 78 BPM | BODY MASS INDEX: 28.72 KG/M2 | WEIGHT: 223.8 LBS

## 2020-07-20 DIAGNOSIS — I10 ESSENTIAL HYPERTENSION: ICD-10-CM

## 2020-07-20 DIAGNOSIS — N18.30 STAGE 3 CHRONIC KIDNEY DISEASE (HCC): ICD-10-CM

## 2020-07-20 DIAGNOSIS — E78.2 MIXED HYPERLIPIDEMIA: ICD-10-CM

## 2020-07-20 DIAGNOSIS — Z79.4 ENCOUNTER FOR LONG-TERM (CURRENT) USE OF INSULIN (HCC): ICD-10-CM

## 2020-07-20 DIAGNOSIS — E11.65 UNCONTROLLED TYPE 2 DIABETES MELLITUS WITH HYPERGLYCEMIA (HCC): Primary | ICD-10-CM

## 2020-07-20 LAB — HBA1C MFR BLD HPLC: 5.4 %

## 2020-07-20 RX ORDER — SEMAGLUTIDE 1.34 MG/ML
1 INJECTION, SOLUTION SUBCUTANEOUS
Qty: 2 BOX | Refills: 6 | Status: SHIPPED | OUTPATIENT
Start: 2020-07-20 | End: 2021-04-28

## 2020-07-20 NOTE — PATIENT INSTRUCTIONS
--------------------------------------------------------------------------------------------    Refills    -    please call your pharmacy and have them send us a refill request    Results  -  allow up to a week for lab results to be processed and reviewed. Phone calls  -  Allow upto 24 hrs.  for non-urgent calls to be retained    Prior authorization - It may take up to 4 weeks to process, depending on your insurance    Forms  -  FMLA, DMV, patient assistance, etc. will take up to 2 weeks to process    Cancellations - please notify the office in advance if you cannot keep your appointment    Samples  - will only be dispensed at visits as supply is limited      If you are having a medical emergency call 911    --------------------------------------------------------------------------------------------    DRINK water   Do not skip meals  Do not eat in between meals    Reduce carbs- pasta, rice, potatoes, bread   Do not drink juices or sodas, even they are calorie zero or diet drinks  Do not eat peanut butter     Do not eat sugar free cookies and cakes   Do not eat peaches, oranges, pineapples, raisins, grapes , canteloupe , honey dew and fruit medleys        --------------------------------------------------------------------------------------------------------------------------      Decrease on basaglar 28   Units a day        Decrease  on starlix  120 mg  HALF PILL    right before each meal       Stay on ozempic    1 mg  a  Week       Stay on actos a day

## 2020-07-20 NOTE — PROGRESS NOTES
1. Have you been to the ER, urgent care clinic since your last visit? No  Hospitalized since your last visit? No    2. Have you seen or consulted any other health care providers outside of the [address] [place] [address] since your last visit? Include any pap smears or colon screening. No    Wt Readings from Last 3 Encounters:   07/20/20 223 lb 12.8 oz (101.5 kg)   06/11/20 223 lb 6.4 oz (101.3 kg)   06/27/17 195 lb (88.5 kg)     Temp Readings from Last 3 Encounters:   07/20/20 98 °F (36.7 °C) (Oral)   06/11/20 97.9 °F (36.6 °C) (Oral)   04/03/17 98.7 °F (37.1 °C)     BP Readings from Last 3 Encounters:   07/20/20 124/82   06/11/20 124/79   06/27/17 (!) 87/56     Pulse Readings from Last 3 Encounters:   07/20/20 78   06/11/20 88   06/27/17 (!) 104     Lab Results   Component Value Date/Time    Hemoglobin A1c 10.8 (H) 06/27/2017 04:52 PM    Hemoglobin A1c (POC) 5.4 07/20/2020 10:04 AM    Hemoglobin A1c, External 11.6 03/21/2020     Patient has freestyle [place] today.

## 2020-07-20 NOTE — LETTER
7/20/20 Patient: Bridgette Patrick. YOB: 1965 Date of Visit: 7/20/2020 Tawanda Girard MD 
[address] [place] [id number] VIA Facsimile: [phone] Dear Tawanda Girard MD, Thank you for referring Mr. Aggie Gates to [facility] for evaluation. My notes for this consultation are attached. If you have questions, please do not hesitate to call me. I look forward to following your patient along with you. Sincerely, Carmen Delgadillo MD

## 2020-07-20 NOTE — PROGRESS NOTES
HISTORY OF PRESENT ILLNESS  Aidan Soares is a 53 y.o. male. First follow up after  June virtual  visit  -   He is very happy that his sugars are very good   Denies low sugars         June 2020     Referred by PCP   Initial visit for  Thyroid disease   And  Diabetes type 2     Patient here for initial visit of Type 2 diabetes mellitus . H/o diabetes for 25 years     Current A1C is over 11 %   and symptoms/problems include fluctuant sugars     Current diabetic medications include Levemir 34 units at night, Ozempic 1 mg once a week, Actos 30 mg a day and NovoLog which was prescribed as needed    Current monitoring regimen: home blood tests - rarely  Home blood sugar records: trend: fluctuating a lot  Any episodes of hypoglycemia? no    Weight trend: stable  Prior visit with dietician: no  Current diet: well balanced  Current exercise: walking    Known diabetic complications: nephropathy  Cardiovascular risk factors: dyslipidemia, diabetes mellitus, male gender, hypertension, stress    Eye exam current (within one year): no  LILLY: no     Past Medical History:   Diagnosis Date    Albuminuria     Hypertension     Type 2 diabetes mellitus ([facility] [address]     1995     Past Surgical History:   Procedure Laterality Date    HX SEPTOPLASTY       Current Outpatient Medications   Medication Sig    atorvastatin (LIPITOR) 40 mg tablet Take  by mouth daily.  lisinopriL (PRINIVIL, ZESTRIL) 20 mg tablet Take 20 mg by mouth two (2) times a day.  amLODIPine (NORVASC) 5 mg tablet Take 5 mg by mouth daily.  allopurinoL (ZYLOPRIM) 100 mg tablet Take  by mouth daily.  metoprolol succinate (TOPROL-XL) 25 mg XL tablet Take  by mouth daily.  insulin aspart U-100 (NovoLOG Flexpen U-100 Insulin) 100 unit/mL (3 mL) inpn by SubCUTAneous route. Sliding Scale    insulin glargine (Basaglar KwikPen U-100 Insulin) 100 unit/mL (3 mL) inpn 34 Units by SubCUTAneous route daily.     nateglinide (STARLIX) 120 mg tablet Take 1 Tab by mouth Before breakfast, lunch, and dinner. (Patient taking differently: Take 120 mg by mouth Before breakfast and dinner.)    semaglutide (Ozempic) 1 mg/dose (2 mg/1.5 mL) sub-q pen 1 mg by SubCUTAneous route every seven (7) days.  pioglitazone (ACTOS) 30 mg tablet Take 1 Tab by mouth daily.  flash glucose scanning reader (FreeStyle Kayce 14 Day [place]) misc Test 2 times daily. Dx code E11.65    flash glucose sensor (FreeStyle Kayce 14 Day Sensor) kit Test 2 times daily. Dx code E11.65    ONETOUCH VERIO strip 3 times daily    ONETOUCH VERIO FLEX misc 3 times daily.  aspirin delayed-release (Aspir-Low) 81 mg tablet Take 81 mg by mouth daily. No current facility-administered medications for this visit. Review of Systems   Constitutional: Negative. HENT: Negative. Eyes: Negative for pain and redness. Respiratory: Negative. Cardiovascular: Negative for chest pain, palpitations and leg swelling. Gastrointestinal: Negative. Negative for constipation. Genitourinary: Negative. Musculoskeletal: Negative for myalgias. Skin: Negative. Neurological: Negative. Endo/Heme/Allergies: Negative. Psychiatric/Behavioral: Negative for depression and memory loss. The patient does not have insomnia. Physical Exam  Constitutional:       Appearance: He is well-developed. HENT:      Head: Normocephalic. Eyes:      Conjunctiva/sclera: Conjunctivae normal.      Pupils: Pupils are equal, round, and reactive to light. Neck:      Musculoskeletal: Normal range of motion and neck supple. Cardiovascular:      Rate and Rhythm: Normal rate and regular rhythm. Pulmonary:      Effort: Pulmonary effort is normal.      Breath sounds: Normal breath sounds. Abdominal:      General: Bowel sounds are normal.      Palpations: Abdomen is soft. Musculoskeletal: Normal range of motion. Skin:     General: Skin is warm and dry.    Neurological:      Mental Status: He is alert and oriented to person, place, and time. Deep Tendon Reflexes: Reflexes are normal and symmetric. Reviewed labs from pcp             ASSESSMENT and PLAN    1. Type 2 DM,poorly controlled : a1c is   5.4 %    From today      July 2020   compared to    over 11 %     July 2020   On vivian   Doing dramatically   well   DECREASE basaglar 28  Units a day ;    DECREASE  on starlix 120 mg  TO HALF PILL AT right before each meal   Stay on ozempic    1 mg  a  Week   Stay on actos a day       June 2020     Discussed DM 2 patho-physiology extensively   Reviewed the glucose log : occasional sugars  93 to 237,  Mg     He will stay on Levemir 34 units at night but he will try Starlix 120 mg 3 times a day. as initial step  He is informed that this initial step will work well if he has some pancreatic function along with diet control. He also is educated about the next steps which would be starting on [place] and considering Afrezza     He will continue to stay on Ozempic and Actos. I have gone extensively around the medication side effects. I also made him aware of the renal dynamics when it comes to medications and insulin use  I also encouraged him to try for freestyle vivian sensors. Patient is advised about checking blood sugars 4 times a day and maintaining log book. The danger of having low blood sugars has been explained with inappropriate use of insulin  Patient voiced understanding and using the printed instructions at home. Hypoglycemia management has been explained to the patient. lab results and schedule of future lab studies reviewed with patient      2. Hypoglycemia :  Educated on treating the hypoglycemia. Discussed insulin use and prescribed    3. HTN : continue lisinopril 20 mg bid , toprol xl, NORVASC   . Patient is educated about importance of compliance with anti-hypertensives especially ARB/ACEI    4. Dyslipidemia : continue lipitor 40 mg .  Patient is educated about benefits and adverse effects of statins and explained how benefits outweigh risk. 5. use of aspirin to prevent MI and TIA's discussed        6. Diabetic complications :     A. Retinopathy-YES   educated on this complication,  regular f/u with ophthalmologist encouraged    B. Nephropathy - YES     Creat 2.3 from may 2020 labs   REFER TO  nephrologist encouraged    C. Peripheral Neuropathy - NO  ,   educated on this disease and indicated improvement with good and stable glycemic control    D. PAD : NO     order LILLY /PVR    E : CAD : NO    F. Erectile dysfunction  :NO        6. Thyroid disease : resplved   S/p LAKE therapy for  Goiter  30 mci  -  LAKE therapy ;  Did not respond for LAKE therapy  Patient says  He is EUTHYROID  Without any help based on recent labs         > 50 % of time is spent on counseling   Patient voiced understanding her plan of care

## 2020-07-29 ENCOUNTER — DOCUMENTATION ONLY (OUTPATIENT)
Dept: ENDOCRINOLOGY | Age: 55
End: 2020-07-29

## 2020-07-30 NOTE — PROGRESS NOTES
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

## 2020-08-05 ENCOUNTER — VIRTUAL VISIT (OUTPATIENT)
Dept: ENDOCRINOLOGY | Age: 55
End: 2020-08-05
Payer: COMMERCIAL

## 2020-08-05 DIAGNOSIS — E11.65 UNCONTROLLED TYPE 2 DIABETES MELLITUS WITH HYPERGLYCEMIA (HCC): Primary | ICD-10-CM

## 2020-08-05 DIAGNOSIS — Z79.4 ENCOUNTER FOR LONG-TERM (CURRENT) USE OF INSULIN (HCC): ICD-10-CM

## 2020-08-05 DIAGNOSIS — N18.30 STAGE 3 CHRONIC KIDNEY DISEASE (HCC): ICD-10-CM

## 2020-08-05 PROCEDURE — 99212 OFFICE O/P EST SF 10 MIN: CPT | Performed by: INTERNAL MEDICINE

## 2020-08-05 NOTE — PROGRESS NOTES
1. Have you been to the ER, urgent care clinic since your last visit? No  Hospitalized since your last visit? No    2. Have you seen or consulted any other health care providers outside of the [address] [place] [address] since your last visit? Include any pap smears or colon screening.  No

## 2020-08-05 NOTE — PROGRESS NOTES
HISTORY OF PRESENT ILLNESS  Jessica Reese is a 53 y.o. male. 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 Type 2 DM,poorly controlled : a1c is over 11 %     I did  FILL UP FORMS  I COULD NOT FILL UP THE EYE VISIT PART AS HE HAD EYE VISIT DONE MANY YEARS AGO     He will stay on Levemir 34 units at night and  Starlix 120 mg 3 times a day. He is informed that this initial step will work well if he has some pancreatic function along with diet control. He also is educated about the next steps which would be starting on [place] and considering Afrezza   He will continue to stay on Ozempic and Actos. Patient is advised about checking blood sugars 4 times a day and maintaining log book. 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 Hypoglycemia :  Educated on treating the hypoglycemia. Discussed insulin use and prescribed    3. HTN : continue lisinopril 20 mg bid , toprol xl . Patient is educated about importance of compliance with anti-hypertensives especially ARB/ACEI    4. Dyslipidemia : continue lipitor 40 mg . Patient is educated about benefits and adverse effects of statins and explained how benefits outweigh risk. 5. use of aspirin to prevent MI and TIA's discussed        6. Diabetic complications :     A. Retinopathy-YES   educated on this complication,  regular f/u with ophthalmologist encouraged    B. Nephropathy - YES     Creat 2.3 from may 2020 labs     educated on this disease and indicated stages and its prognosis. Regular care with nephrologist encouraged    C. Peripheral Neuropathy - NO  ,   educated on this disease and indicated improvement with good and stable glycemic control    D. PAD : NO     order LILLY /PVR    E : CAD : NO    F. Erectile dysfunction  :NO        6.  Thyroid disease : resplved   S/p LAKE therapy for Goiter  30 mci  -  LAKE therapy ;  Did not respond for LAKE therapy  Patient says  He is EUTHYROID  Without any help based on recent labs         > 50 % of time is spent on counseling   Patient voiced understanding her plan of care

## 2020-08-13 ENCOUNTER — DOCUMENTATION ONLY (OUTPATIENT)
Dept: ENDOCRINOLOGY | Age: 55
End: 2020-08-13

## 2020-08-14 NOTE — PROGRESS NOTES
Reviewed the ophthalmology visit report from [facility] eye clinic  Noted that this patient's eye exam was last done on May 24, 2010    I did not see any description of diabetes related  changes exam,  but this was done 10 years ago more for bifocal lenses      So please inform patient that this was thought to hold off on exam and will not be able to go through to fill his papers up and kindly have a diabetic dilated eye exam so he can finish up his paperwork

## 2020-11-16 ENCOUNTER — OFFICE VISIT (OUTPATIENT)
Dept: ENDOCRINOLOGY | Age: 55
End: 2020-11-16
Payer: COMMERCIAL

## 2020-11-16 VITALS
TEMPERATURE: 98.9 F | SYSTOLIC BLOOD PRESSURE: 178 MMHG | RESPIRATION RATE: 18 BRPM | DIASTOLIC BLOOD PRESSURE: 105 MMHG | HEIGHT: 74 IN | BODY MASS INDEX: 28.93 KG/M2 | OXYGEN SATURATION: 99 % | WEIGHT: 225.4 LBS | HEART RATE: 80 BPM

## 2020-11-16 DIAGNOSIS — E78.2 MIXED HYPERLIPIDEMIA: ICD-10-CM

## 2020-11-16 DIAGNOSIS — Z79.4 ENCOUNTER FOR LONG-TERM (CURRENT) USE OF INSULIN (HCC): ICD-10-CM

## 2020-11-16 DIAGNOSIS — E11.65 UNCONTROLLED TYPE 2 DIABETES MELLITUS WITH HYPERGLYCEMIA (HCC): Primary | ICD-10-CM

## 2020-11-16 DIAGNOSIS — N18.31 STAGE 3A CHRONIC KIDNEY DISEASE (HCC): ICD-10-CM

## 2020-11-16 DIAGNOSIS — I10 ESSENTIAL HYPERTENSION: ICD-10-CM

## 2020-11-16 LAB — HBA1C MFR BLD HPLC: 5.4 %

## 2020-11-16 PROCEDURE — 99214 OFFICE O/P EST MOD 30 MIN: CPT | Performed by: INTERNAL MEDICINE

## 2020-11-16 PROCEDURE — 83036 HEMOGLOBIN GLYCOSYLATED A1C: CPT | Performed by: INTERNAL MEDICINE

## 2020-11-16 NOTE — PATIENT INSTRUCTIONS
-------------------------------------------------------------------------------------------- Refills    -    please call your pharmacy and have them send us a refill request 
 
Results  -  allow up to a week for lab results to be processed and reviewed. Phone calls  -  Allow upto 24 hrs. for non-urgent calls to be retained Prior authorization - It may take up to 4 weeks to process, depending on your insurance Forms  -  FMLA, DMV, patient assistance, etc. will take up to 2 weeks to process Cancellations - please notify the office in advance if you cannot keep your appointment Samples  - will only be dispensed at visits as supply is limited If you are having a medical emergency call 911 
 
-------------------------------------------------------------------------------------------- [address] [facility] [place] water Do not skip meals Do not eat in between meals Reduce carbs- pasta, rice, potatoes, bread Do not drink juices or sodas, even they are calorie zero or diet drinks Do not eat peanut butter Do not eat sugar free cookies and cakes Do not eat peaches, oranges, pineapples, raisins, grapes , canteloupe , honey dew and fruit medleys 
 
 
 
----------------------------------------------------------------------------------------------------------------- Stop all insulins - Stay on starlix  120 mg  HALF PILL    right before each meal  
 
Do not take it if you are not eating Cut the pill to half if eating lesser than normal  
Do not avoid lunches Stay on ozempic    1 mg  a  Week Stay on actos a day

## 2020-11-16 NOTE — PROGRESS NOTES
1. Have you been to the ER, urgent care clinic since your last visit? No  Hospitalized since your last visit? No    2. Have you seen or consulted any other health care providers outside of the [address] [place] [address] since your last visit? Include any pap smears or colon screening. No    Wt Readings from Last 3 Encounters:   11/16/20 225 lb 6.4 oz (102.2 kg)   07/20/20 223 lb 12.8 oz (101.5 kg)   06/11/20 223 lb 6.4 oz (101.3 kg)     Temp Readings from Last 3 Encounters:   11/16/20 98.9 °F (37.2 °C) (Temporal)   07/20/20 98 °F (36.7 °C) (Oral)   06/11/20 97.9 °F (36.6 °C) (Oral)     BP Readings from Last 3 Encounters:   11/16/20 (!) 178/105   07/20/20 124/82   06/11/20 124/79     Pulse Readings from Last 3 Encounters:   11/16/20 80   07/20/20 78   06/11/20 88     Lab Results   Component Value Date/Time    Hemoglobin A1c 10.8 (H) 06/27/2017 04:52 PM    Hemoglobin A1c (POC) 5.4 07/20/2020 10:04 AM    Hemoglobin A1c, External 11.6 03/21/2020     Patient has freestyle vivian today.

## 2020-11-16 NOTE — LETTER
11/22/20 Patient: Ashanti Lopez. YOB: 1965 Date of Visit: 11/16/2020 Denis Mullins MD 
[address] [place] [id number] VIA Facsimile: [phone] Dear Denis Mullins MD, Thank you for referring Mr. Shazia Vargas to [facility] for evaluation. My notes for this consultation are attached. If you have questions, please do not hesitate to call me. I look forward to following your patient along with you. Sincerely, Emily Bentley MD

## 2020-11-16 NOTE — PROGRESS NOTES
HISTORY OF PRESENT ILLNESS  Jair Macedo. is a 63 y.o. male. Last visit  Aug 2020   He is very happy that his sugars are very good   Denies low sugars     July 2020   Referred by PCP   Initial visit for  Thyroid disease   And  Diabetes type 2   Patient here for initial visit of Type 2 diabetes mellitus   H/o diabetes for 25 years   Current A1C is over 11 %   and symptoms/problems include fluctuant sugars   Current diabetic medications include Levemir 34 units at night, Ozempic 1 mg once a week, Actos 30 mg a day and NovoLog which was prescribed as needed          Past Medical History:   Diagnosis Date    Albuminuria     Hypertension     Type 2 diabetes mellitus ([facility] [address]     1995     Past Surgical History:   Procedure Laterality Date    HX SEPTOPLASTY       Current Outpatient Medications   Medication Sig    semaglutide (Ozempic) 1 mg/dose (2 mg/1.5 mL) sub-q pen 1 mg by SubCUTAneous route every seven (7) days.  atorvastatin (LIPITOR) 40 mg tablet Take  by mouth daily.  lisinopriL (PRINIVIL, ZESTRIL) 20 mg tablet Take 20 mg by mouth two (2) times a day.  amLODIPine (NORVASC) 5 mg tablet Take 5 mg by mouth daily.  allopurinoL (ZYLOPRIM) 100 mg tablet Take  by mouth daily.  metoprolol succinate (TOPROL-XL) 25 mg XL tablet Take  by mouth daily.  nateglinide (STARLIX) 120 mg tablet Take 1 Tab by mouth Before breakfast, lunch, and dinner. (Patient taking differently: Take 120 mg by mouth Before breakfast and dinner.)    pioglitazone (ACTOS) 30 mg tablet Take 1 Tab by mouth daily.  flash glucose scanning reader (FreeStyle Kayce 14 Day [place]) misc Test 2 times daily. Dx code E11.65    flash glucose sensor (FreeStyle Kayce 14 Day Sensor) kit Test 2 times daily. Dx code E11.65    ONETOUCH VERIO strip 3 times daily    ONETOUCH VERIO FLEX misc 3 times daily.  aspirin delayed-release (Aspir-Low) 81 mg tablet Take 81 mg by mouth daily.      No current facility-administered medications for this visit. Review of Systems   Constitutional: Negative. HENT: Negative. Eyes: Negative for pain and redness. Respiratory: Negative. Cardiovascular: Negative for chest pain, palpitations and leg swelling. Gastrointestinal: Negative. Negative for constipation. Genitourinary: Negative. Musculoskeletal: Negative for myalgias. Skin: Negative. Neurological: Negative. Endo/Heme/Allergies: Negative. Psychiatric/Behavioral: Negative for depression and memory loss. The patient does not have insomnia. Physical Exam  Constitutional:       Appearance: He is well-developed. HENT:      Head: Normocephalic. Eyes:      Conjunctiva/sclera: Conjunctivae normal.      Pupils: Pupils are equal, round, and reactive to light. Neck:      Musculoskeletal: Normal range of motion and neck supple. Cardiovascular:      Rate and Rhythm: Normal rate and regular rhythm. Pulmonary:      Effort: Pulmonary effort is normal.      Breath sounds: Normal breath sounds. Abdominal:      General: Bowel sounds are normal.      Palpations: Abdomen is soft. Musculoskeletal: Normal range of motion. Skin:     General: Skin is warm and dry. Neurological:      Mental Status: He is alert and oriented to person, place, and time. Deep Tendon Reflexes: Reflexes are normal and symmetric. Reviewed labs from pcp             ASSESSMENT and PLAN    1.  Type 2 DM, uncontrolled : a1c is   5.4 %     From  Today by POC   ,  Compared to    over 11 %       Nov 2020 - very good control likely from having renal insufficiency   No insulins at all     Stay on ozempic, actos and starlix   He is extremely happy that he has such good control     Reviewed   Libreview AGP  report for 14 days and discussed with pt      - 14 day average glucose 99  - % for high and low sugars and % in Target  Range  86%    - percent of utiization 34 %  - CV% 29.%          July 2020     I did  FILL UP FORMS  I COULD NOT FILL UP THE EYE VISIT PART AS HE HAD EYE VISIT DONE MANY YEARS AGO     He will stay on Levemir 34 units at night and  Starlix 120 mg 3 times a day. He is informed that this initial step will work well if he has some pancreatic function along with diet control. He also is educated about the next steps which would be starting on V-go and considering Afrezza   He will continue to stay on Ozempic and Actos. Patient is advised about checking blood sugars 4 times a day and maintaining log book. 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 Hypoglycemia :  Educated on treating the hypoglycemia. Discussed insulin use and prescribed    3. HTN : continue lisinopril 20 mg bid , toprol xl . Patient is educated about importance of compliance with anti-hypertensives especially ARB/ACEI    4. Dyslipidemia : continue lipitor 40 mg . Patient is educated about benefits and adverse effects of statins and explained how benefits outweigh risk. 5. use of aspirin to prevent MI and TIA's discussed        6. Diabetic complications :     A. Retinopathy-YES   educated on this complication,  regular f/u with ophthalmologist encouraged    B. Nephropathy - YES     Creat 2.3 from may 2020 labs     educated on this disease and indicated stages and its prognosis. Regular care with nephrologist encouraged    C. Peripheral Neuropathy - NO  ,   educated on this disease and indicated improvement with good and stable glycemic control    D. PAD : NO     order LILLY /PVR    E : CAD : NO    F. Erectile dysfunction  :NO        6. Thyroid disease : resplved   S/p LAKE therapy for  Goiter  30 mci  -  LAKE therapy ;  Did not respond for LAKE therapy  Patient says  He is EUTHYROID  Without any help based on recent labs         > 50 % of time is spent on counseling   Patient voiced understanding her plan of care

## 2020-12-11 DIAGNOSIS — E11.65 UNCONTROLLED TYPE 2 DIABETES MELLITUS WITH HYPERGLYCEMIA (HCC): Primary | ICD-10-CM

## 2020-12-11 DIAGNOSIS — E78.2 MIXED HYPERLIPIDEMIA: ICD-10-CM

## 2020-12-11 DIAGNOSIS — I10 ESSENTIAL HYPERTENSION: ICD-10-CM

## 2020-12-11 RX ORDER — LISINOPRIL 20 MG/1
20 TABLET ORAL 2 TIMES DAILY
Qty: 180 TAB | Refills: 3 | Status: SHIPPED | OUTPATIENT
Start: 2020-12-11 | End: 2021-11-02

## 2020-12-16 RX ORDER — FLASH GLUCOSE SENSOR
KIT MISCELLANEOUS
Qty: 2 KIT | Refills: 6 | Status: SHIPPED | OUTPATIENT
Start: 2020-12-16 | End: 2021-08-17

## 2020-12-23 RX ORDER — ATORVASTATIN CALCIUM 40 MG/1
TABLET, FILM COATED ORAL
Qty: 90 TAB | Refills: 3 | Status: SHIPPED | OUTPATIENT
Start: 2020-12-23 | End: 2021-12-02

## 2021-01-25 RX ORDER — PIOGLITAZONEHYDROCHLORIDE 30 MG/1
TABLET ORAL
Qty: 30 TAB | Refills: 6 | Status: SHIPPED | OUTPATIENT
Start: 2021-01-25 | End: 2021-03-19 | Stop reason: SDUPTHER

## 2021-01-25 RX ORDER — NATEGLINIDE 120 MG/1
TABLET ORAL
Qty: 90 TAB | Refills: 6 | Status: CANCELLED | OUTPATIENT
Start: 2021-01-25

## 2021-01-25 RX ORDER — NATEGLINIDE 120 MG/1
TABLET ORAL
Qty: 90 TAB | Refills: 6 | Status: SHIPPED | OUTPATIENT
Start: 2021-01-25 | End: 2021-03-19 | Stop reason: SDUPTHER

## 2021-01-25 RX ORDER — PIOGLITAZONEHYDROCHLORIDE 30 MG/1
TABLET ORAL
Qty: 30 TAB | Refills: 6 | Status: CANCELLED | OUTPATIENT
Start: 2021-01-25

## 2021-03-17 ENCOUNTER — TELEPHONE (OUTPATIENT)
Dept: ENDOCRINOLOGY | Age: 56
End: 2021-03-17

## 2021-03-17 RX ORDER — PIOGLITAZONEHYDROCHLORIDE 30 MG/1
TABLET ORAL
Qty: 30 TAB | Refills: 6 | Status: CANCELLED | OUTPATIENT
Start: 2021-03-17

## 2021-03-17 RX ORDER — NATEGLINIDE 120 MG/1
TABLET ORAL
Qty: 90 TAB | Refills: 6 | Status: CANCELLED | OUTPATIENT
Start: 2021-03-17

## 2021-03-17 NOTE — TELEPHONE ENCOUNTER
Spoke with patient and pharmacist at [facility]. Per patient we did not send in refills on Actos and Starlix, reviewed chart and refills were authorized by physician and confirmed received by [facility] on 1/25/2021. Spoke with Wolfgang they state that did never received those prescription authorizations. Please resend to Pradeep Vo. Actos and Starlix. Patient states he did not come to appointment on 3/16/2021 because he did not receive any kind of appointment reminder. Will have manager check on status of reminder calls for this patient.

## 2021-03-19 RX ORDER — PIOGLITAZONEHYDROCHLORIDE 30 MG/1
TABLET ORAL
Qty: 30 TAB | Refills: 6 | Status: SHIPPED | OUTPATIENT
Start: 2021-03-19 | End: 2021-09-03

## 2021-03-19 RX ORDER — NATEGLINIDE 120 MG/1
TABLET ORAL
Qty: 90 TAB | Refills: 6 | Status: SHIPPED | OUTPATIENT
Start: 2021-03-19 | End: 2021-06-02 | Stop reason: DRUGHIGH

## 2021-04-28 RX ORDER — SEMAGLUTIDE 1.34 MG/ML
INJECTION, SOLUTION SUBCUTANEOUS
Qty: 3 ML | Refills: 1 | Status: SHIPPED | OUTPATIENT
Start: 2021-04-28 | End: 2021-09-02 | Stop reason: SDUPTHER

## 2021-06-02 ENCOUNTER — OFFICE VISIT (OUTPATIENT)
Dept: ENDOCRINOLOGY | Age: 56
End: 2021-06-02
Payer: COMMERCIAL

## 2021-06-02 VITALS
RESPIRATION RATE: 18 BRPM | TEMPERATURE: 97 F | SYSTOLIC BLOOD PRESSURE: 115 MMHG | HEART RATE: 74 BPM | OXYGEN SATURATION: 98 % | DIASTOLIC BLOOD PRESSURE: 75 MMHG | HEIGHT: 74 IN | BODY MASS INDEX: 28.11 KG/M2 | WEIGHT: 219 LBS

## 2021-06-02 DIAGNOSIS — E11.65 UNCONTROLLED TYPE 2 DIABETES MELLITUS WITH HYPERGLYCEMIA (HCC): Primary | ICD-10-CM

## 2021-06-02 DIAGNOSIS — E55.9 VITAMIN D DEFICIENCY: ICD-10-CM

## 2021-06-02 DIAGNOSIS — E78.2 MIXED HYPERLIPIDEMIA: ICD-10-CM

## 2021-06-02 DIAGNOSIS — N18.31 STAGE 3A CHRONIC KIDNEY DISEASE (HCC): ICD-10-CM

## 2021-06-02 DIAGNOSIS — I10 ESSENTIAL HYPERTENSION: ICD-10-CM

## 2021-06-02 LAB
25(OH)D3 SERPL-MCNC: 23.1 NG/ML (ref 30–100)
ALBUMIN SERPL-MCNC: 3.4 G/DL (ref 3.5–5)
ALBUMIN/GLOB SERPL: 0.9 {RATIO} (ref 1.1–2.2)
ALP SERPL-CCNC: 156 U/L (ref 45–117)
ALT SERPL-CCNC: 28 U/L (ref 12–78)
ANION GAP SERPL CALC-SCNC: 7 MMOL/L (ref 5–15)
AST SERPL-CCNC: 16 U/L (ref 15–37)
BILIRUB SERPL-MCNC: 0.4 MG/DL (ref 0.2–1)
BUN SERPL-MCNC: 53 MG/DL (ref 6–20)
BUN/CREAT SERPL: 15 (ref 12–20)
CALCIUM SERPL-MCNC: 9.2 MG/DL (ref 8.5–10.1)
CHLORIDE SERPL-SCNC: 106 MMOL/L (ref 97–108)
CHOLEST SERPL-MCNC: 158 MG/DL
CO2 SERPL-SCNC: 22 MMOL/L (ref 21–32)
CREAT SERPL-MCNC: 3.54 MG/DL (ref 0.7–1.3)
CREAT UR-MCNC: 360 MG/DL
GLOBULIN SER CALC-MCNC: 3.6 G/DL (ref 2–4)
GLUCOSE SERPL-MCNC: 229 MG/DL (ref 65–100)
HBA1C MFR BLD HPLC: 7.3 %
HDLC SERPL-MCNC: 54 MG/DL
HDLC SERPL: 2.9 {RATIO} (ref 0–5)
LDLC SERPL CALC-MCNC: 75 MG/DL (ref 0–100)
MICROALBUMIN UR-MCNC: 220 MG/DL
MICROALBUMIN/CREAT UR-RTO: 611 MG/G (ref 0–30)
POTASSIUM SERPL-SCNC: 5.1 MMOL/L (ref 3.5–5.1)
PROT SERPL-MCNC: 7 G/DL (ref 6.4–8.2)
SODIUM SERPL-SCNC: 135 MMOL/L (ref 136–145)
TRIGL SERPL-MCNC: 145 MG/DL (ref ?–150)
VLDLC SERPL CALC-MCNC: 29 MG/DL

## 2021-06-02 PROCEDURE — 3051F HG A1C>EQUAL 7.0%<8.0%: CPT | Performed by: INTERNAL MEDICINE

## 2021-06-02 PROCEDURE — 99214 OFFICE O/P EST MOD 30 MIN: CPT | Performed by: INTERNAL MEDICINE

## 2021-06-02 PROCEDURE — 95251 CONT GLUC MNTR ANALYSIS I&R: CPT | Performed by: INTERNAL MEDICINE

## 2021-06-02 PROCEDURE — 83036 HEMOGLOBIN GLYCOSYLATED A1C: CPT | Performed by: INTERNAL MEDICINE

## 2021-06-02 RX ORDER — NATEGLINIDE 60 MG/1
60 TABLET ORAL
Qty: 90 TABLET | Refills: 6 | Status: SHIPPED | OUTPATIENT
Start: 2021-06-02 | End: 2021-09-02

## 2021-06-02 NOTE — PROGRESS NOTES
Room 2    Identified pt with two pt identifiers(name and ). Reviewed record in preparation for visit and have obtained necessary documentation. All patient medications has been reviewed. Chief Complaint   Patient presents with    Diabetes       3 most recent PHQ Screens 2021   Little interest or pleasure in doing things Not at all   Feeling down, depressed, irritable, or hopeless Not at all   Total Score PHQ 2 0     Abuse Screening Questionnaire 2021   Do you ever feel afraid of your partner? N   Are you in a relationship with someone who physically or mentally threatens you? N   Is it safe for you to go home? Y       Health Maintenance Review: Patient reminded of \"due or due soon\" health maintenance. I have asked the patient to contact his/her primary care provider (PCP) for follow-up on his/her health maintenance. Vitals:    21 1114   BP: 115/75   Pulse: 74   Resp: 18   Temp: 97 °F (36.1 °C)   TempSrc: Oral   SpO2: 98%   Weight: 219 lb (99.3 kg)   Height: 6' 2\" (1.88 m)   PainSc:   0 - No pain         Lab Results   Component Value Date/Time    Hemoglobin A1c 10.8 (H) 2017 04:52 PM    Hemoglobin A1c (POC) 5.4 2020 04:26 PM    Hemoglobin A1c, External 11.6 2020 12:00 AM       Coordination of Care Questionnaire:   1) Have you been to an emergency room, urgent care, or hospitalized since your last visit? YES  [facility]  [facility]c: pt states, went in due to feeling he was about to pass out. Was advised to go to [facility] due to dehydration. 2. Have seen or consulted any other health care provider since your last visit? NO      Patient is accompanied by self I have received verbal consent from Jeannine Dhillon. to discuss any/all medical information while they are present in the room.

## 2021-06-02 NOTE — PROGRESS NOTES
HISTORY OF PRESENT ILLNESS  Salvatore Hopper is a 63 y.o. male. Follow up for type 2 diabetes   After   Last visit   from nov 2020     He is using [employer] [place] [employer] , he says he got hospitalized for  ARF    He saw the nephrologist also   He did nto take starlix , says he did not get refills     Nov 2020     he is very happy that his sugars are very good   Denies low sugars     July 2020   Referred by PCP   Initial visit for  Thyroid disease   And  Diabetes type 2   Patient here for initial visit of Type 2 diabetes mellitus   H/o diabetes for 25 years   Current A1C is over 11 %   and symptoms/problems include fluctuant sugars   Current diabetic medications include Levemir 34 units at night, Ozempic 1 mg once a week, Actos 30 mg a day and NovoLog which was prescribed as needed    Review of Systems   Constitutional: Negative. Psychiatric/Behavioral: Negative for depression and memory loss. The patient does not have insomnia. Physical Exam  Constitutional:       Appearance: He is well-developed. Neurological:      Mental Status: He is alert and oriented to person, place, and time. ASSESSMENT and PLAN    1.  Type 2 DM, uncontrolled : a1c is  7.3 %     From  Today  June 2021   By POC   Compared to      5.4 %     From  Today by POC   ,  Compared to    over 11 %         June 2021     a1c is higher , likely from not taking starlix   He thought he did not have to take starlix as pharmacy had no refills     Stay on ozempic, actos and  Resume  starlix at 60 mg tid   Reviewed   Libreview AGP  report for 14 days and discussed with pt      - 14 day average glucose 99  - 13% for high  Over 240   and low sugars and % in Target  Range  83%  ( his target range was set higher 121- 240 )   - percent of utiization 34 %  - CV% 29.%        Nov 2020 - very good control likely from having renal insufficiency   No insulins at all     Stay on ozempic, actos and starlix   He is extremely happy that he has such good control [facility] and discussed with pt      - 14 day average glucose 99  - % for high and low sugars and % in Target  Range  86%    - percent of utiization 34 %  - CV% 29.%          July 2020     I did  FILL UP FORMS  I COULD NOT FILL UP THE EYE VISIT PART AS HE HAD EYE VISIT DONE MANY YEARS AGO     He will stay on Levemir 34 units at night and  Starlix 120 mg 3 times a day. He is informed that this initial step will work well if he has some pancreatic function along with diet control. He also is educated about the next steps which would be starting on V-go and considering Afrezza   He will continue to stay on Ozempic and Actos. Patient is advised about checking blood sugars 4 times a day and maintaining log book. 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 Hypoglycemia :  Educated on treating the hypoglycemia. Discussed insulin use and prescribed    3. HTN : continue lisinopril 20 mg bid , toprol xl ,  And norvasc . 4. Dyslipidemia : continue lipitor 40 mg .     5. use of aspirin to prevent MI and TIA's discussed    6. Diabetic complications :     A. Retinopathy-YES   educated on this complication,  regular f/u with ophthalmologist encouraged    B. Nephropathy - YES     Creat 2.3 from may 2020 labs - I ordered labs   educated on this disease and indicated stages and its prognosis. Regular care with nephrologist encouraged    C. Peripheral Neuropathy - NO  ,   educated on this disease and indicated improvement with good and stable glycemic control      6. Thyroid disease : resolved   S/p LAKE therapy for  Goiter  30 mci  -  LAKE therapy ;  Did not respond for LAKE therapy  Patient says  He is EUTHYROID  Without any help based on recent labs       Reviewed results with patient and discussed the labs being ordered today/bnv  Patient voiced understanding of plan of care

## 2021-06-02 NOTE — LETTER
6/2/2021 Patient: Emma Garrido. YOB: 1965 Date of Visit: 6/2/2021 Germaine Adams MD 
[address] [place] [phone] Via Fax: [phone] Dear Germaine Adams MD, Thank you for referring Mr. Lane Miguel to [facility] for evaluation. My notes for this consultation are attached. If you have questions, please do not hesitate to call me. I look forward to following your patient along with you. Sincerely, Zane Brewster MD

## 2021-06-02 NOTE — PATIENT INSTRUCTIONS
SPECIFIC INSTRUCTIONS BELOW       DRINK water   Do not skip meals  Do not eat in between meals    Reduce carbs- pasta, rice, potatoes, bread   Do not drink juices or sodas, even they are calorie zero or diet drinks  Do not eat peanut butter     Do not eat sugar free cookies and cakes   Do not eat peaches, oranges, pineapples, raisins, grapes , canteloupe , honey dew and fruit medleys        -----------------------------------------------------------------------------------------------------------------    Stop all insulins -        Stay on starlix  120 mg  HALF PILL    right before each meal     Do not take it if you are not eating   Cut the pill to half if eating lesser than normal   Do not avoid lunches       Stay on ozempic    1 mg  a  Week       Stay on actos a day           [address] tests other than blood work, which you opt to do  outside the  [facility] facilities, you are responsible for prior authorizations if  required    - HEALTH MAINTENANCE IS NOT GOING TO BE UP TO DATE ON YOUR AVS- PLEASE IGNORE   - YOUR MED LIST IS NOT UP TO DATE AS SOME CHANGES ARE BEING MADE AFTER THE VISIT - FOLLOW SPECIFIC INSTRUCTIONS  ABOVE     Results     *Normal results will not be notified by a phone call starting January 1 2021   *If you have an upcoming visit, the results will be discussed at the visit   *Please sign up for MY CHART if you want access to your lab and test results  *Abnormal results which require immediate attention will be notified by phone call   *Abnormal results which do not require immediate assistance will be notified in 1-2 weeks       Refills    -    have your pharmacy send us a refill request  Phone calls  -  Allow  24 hrs.  for non-urgent calls to be returned  Prior authorization - It may take 2-4 weeks to process  Forms  -  FMLA, DMV etc., will take up to 2 weeks to process  Cancellations - please notify the office 2 days in advance   Samples  - will only be dispensed at visits     --------------------------------------------------------------------------------------------

## 2021-08-03 ENCOUNTER — TELEPHONE (OUTPATIENT)
Dept: ENDOCRINOLOGY | Age: 56
End: 2021-08-03

## 2021-08-17 RX ORDER — FLASH GLUCOSE SENSOR
KIT MISCELLANEOUS
Qty: 2 KIT | Refills: 3 | Status: SHIPPED | OUTPATIENT
Start: 2021-08-17 | End: 2021-12-29

## 2021-09-02 ENCOUNTER — OFFICE VISIT (OUTPATIENT)
Dept: ENDOCRINOLOGY | Age: 56
End: 2021-09-02
Payer: COMMERCIAL

## 2021-09-02 VITALS
BODY MASS INDEX: 28.64 KG/M2 | DIASTOLIC BLOOD PRESSURE: 95 MMHG | RESPIRATION RATE: 18 BRPM | HEART RATE: 73 BPM | OXYGEN SATURATION: 99 % | WEIGHT: 223.2 LBS | TEMPERATURE: 98 F | SYSTOLIC BLOOD PRESSURE: 168 MMHG | HEIGHT: 74 IN

## 2021-09-02 DIAGNOSIS — I10 ESSENTIAL HYPERTENSION: ICD-10-CM

## 2021-09-02 DIAGNOSIS — E78.2 MIXED HYPERLIPIDEMIA: ICD-10-CM

## 2021-09-02 DIAGNOSIS — E11.65 UNCONTROLLED TYPE 2 DIABETES MELLITUS WITH HYPERGLYCEMIA (HCC): Primary | ICD-10-CM

## 2021-09-02 LAB — HBA1C MFR BLD HPLC: 5.7 %

## 2021-09-02 PROCEDURE — 99214 OFFICE O/P EST MOD 30 MIN: CPT | Performed by: INTERNAL MEDICINE

## 2021-09-02 PROCEDURE — 83036 HEMOGLOBIN GLYCOSYLATED A1C: CPT | Performed by: INTERNAL MEDICINE

## 2021-09-02 PROCEDURE — 95251 CONT GLUC MNTR ANALYSIS I&R: CPT | Performed by: INTERNAL MEDICINE

## 2021-09-02 RX ORDER — METOPROLOL SUCCINATE 50 MG/1
50 TABLET, EXTENDED RELEASE ORAL DAILY
Qty: 90 TABLET | Refills: 3 | Status: SHIPPED | OUTPATIENT
Start: 2021-09-02 | End: 2022-01-12

## 2021-09-02 RX ORDER — SEMAGLUTIDE 1.34 MG/ML
INJECTION, SOLUTION SUBCUTANEOUS
Qty: 3 ML | Refills: 6 | Status: SHIPPED | OUTPATIENT
Start: 2021-09-02 | End: 2022-05-26

## 2021-09-02 RX ORDER — AMLODIPINE BESYLATE 10 MG/1
10 TABLET ORAL DAILY
Qty: 90 TABLET | Refills: 3 | Status: SHIPPED | OUTPATIENT
Start: 2021-09-02

## 2021-09-02 NOTE — PROGRESS NOTES
Room 2    Identified pt with two pt identifiers(name and ). Reviewed record in preparation for visit and have obtained necessary documentation. All patient medications has been reviewed. No chief complaint on file. 3 most recent PHQ Screens 2021   Little interest or pleasure in doing things Not at all   Feeling down, depressed, irritable, or hopeless Not at all   Total Score PHQ 2 0         Health Maintenance Review: Patient reminded of \"due or due soon\" health maintenance. I have asked the patient to contact his/her primary care provider (PCP) for follow-up on his/her health maintenance. Vitals:    21 1206   Pulse: 73   Resp: 18   Temp: 98 °F (36.7 °C)   TempSrc: Temporal   SpO2: 99%   Weight: 223 lb 3.2 oz (101.2 kg)   Height: 6' 2\" (1.88 m)   PainSc:   0 - No pain         Lab Results   Component Value Date/Time    Hemoglobin A1c 10.8 (H) 2017 04:52 PM    Hemoglobin A1c (POC) 7.3 2021 11:49 AM    Hemoglobin A1c, External 11.6 2020 12:00 AM       Coordination of Care Questionnaire:   1) Have you been to an emergency room, urgent care, or hospitalized since your last visit?   no       2. Have seen or consulted any other health care provider since your last visit? NO      Patient is accompanied by self I have received verbal consent from Santi Montano. to discuss any/all medical information while they are present in the room.

## 2021-09-02 NOTE — PATIENT INSTRUCTIONS
SPECIFIC INSTRUCTIONS BELOW       DRINK water   Do not skip meals  Do not eat in between meals    Reduce carbs- pasta, rice, potatoes, bread   Do not drink juices or sodas, even they are calorie zero or diet drinks  Do not eat peanut butter     Do not eat sugar free cookies and cakes   Do not eat peaches, oranges, pineapples, raisins, grapes , canteloupe , honey dew and fruit medleys    -----------------------------------------------------------------------------    Stay on lisinopril every day     increase amlodipine   10 mg a day     Increase metoprolol xl  50 mg once a day         -----------------------------------------------------------------------------------------------------------------       Stop Starlix  60 mg      Stay on ozempic    1 mg  a  Week     Stay on actos a day           -------------PAY ATTENTION TO [address] [place], [place] [phone] -----------------    -ANY tests other than blood work, which you opt to do  outside the  [facility] imaging facilities, you are responsible for prior authorizations if  required    - HEALTH MAINTENANCE IS NOT GOING TO BE UP TO DATE ON YOUR AVS- PLEASE IGNORE   - YOUR MED LIST IS NOT UP TO DATE AS SOME CHANGES ARE BEING MADE AFTER THE VISIT - FOLLOW SPECIFIC INSTRUCTIONS  ABOVE     Results     *Normal results will not be notified by a phone call starting January 1 2021   *If you have an upcoming visit, the results will be discussed at the visit   *Please sign up for MY CHART if you want access to your lab and test results  *Abnormal results which require immediate attention will be notified by phone call   *Abnormal results which do not require immediate assistance will be notified in 1-2 weeks       Refills    -    have your pharmacy send us a refill request . Refills are done max for one year and a visit is a must before refills are extended    Follow up appointments -  highly encourage you to make it when you are checking out.  We can accommodate you into the schedule based on your clinical situation, but not for extending refills beyond a year. Labs are important to give refills and is important to get labs before the visit     Phone calls  -  Allow  24 hrs.  for non-urgent calls to be returned  Prior authorization - It may take 2-4 weeks to process  Forms  -  FMLA, DMV etc., will take up to 2 weeks to process  Cancellations - please notify the office 2 days in advance   Samples  - will only be dispensed at visits       If not showing for the appointments and cancelling appointments within 24 hours are kept track of and three  of such situations in  two consecutive years will likely be considered for termination from the practice    -------------------------------------------------------------------------------------------------------------------

## 2021-09-02 NOTE — PROGRESS NOTES
HISTORY OF PRESENT ILLNESS  Martha Munroe is a 63 y.o. male. Follow up for type 2 diabetes   After   Last visit   from June 2021     He is doing well         June 2021   He is using [employer] , he says he got hospitalized for  ARF    He saw the nephrologist also   He did nto take starlix , says he did not get refills     Nov 2020   he is very happy that his sugars are very good   Denies low sugars     July 2020   Referred by PCP   Initial visit for  Thyroid disease   And  Diabetes type 2   Patient here for initial visit of Type 2 diabetes mellitus   H/o diabetes for 25 years   Current A1C is over 11 %   and symptoms/problems include fluctuant sugars   Current diabetic medications include Levemir 34 units at night, Ozempic 1 mg once a week, Actos 30 mg a day and NovoLog which was prescribed as needed    Review of Systems   Constitutional: Negative. Psychiatric/Behavioral: Negative for depression and memory loss. The patient does not have insomnia. Physical Exam  Constitutional: Appearance: He is well-developed. Neurological:    Mental Status: He is alert and oriented to person, place, and time. Reviewed labs from June 2021       ASSESSMENT and PLAN    1.  Type 2 DM, uncontrolled : a1c is   5.1 %   From     Today  By POC   Compared to     7.3 %     From  Today  June 2021   By POC   Compared to      5.4 %     From  Today by POC   ,  Compared to    over 11 %       Sept 2021     He has stage 4 ckd     Very good control   Graph is disconnected     - 14 day average glucose 120  - 13% for high  Over 240   and low sugars and % in Target  Range  94%  ( his target range was set higher 121- 240 )   - percent of utiization 24 %  - CV% 29.%    Stay on actos, ozempic  And   starlix         June 2021     a1c is higher , likely from not taking starlix   He thought he did not have to take starlix as pharmacy had no refills     Stay on ozempic, actos and  Resume  starlix at 60 mg tid   Reviewed   Annabelle Orellana AGP  report for 14 days and discussed with pt      - 14 day average glucose 99  - 13% for high  Over 240   and low sugars and % in Target  Range  83%  ( his target range was set higher 121- 240 )   - percent of utiization 34 %  - CV% 29.%        Nov 2020 - very good control likely from having renal insufficiency   No insulins at all     Stay on ozempic, actos and starlix   He is extremely happy that he has such good control   Reviewed   Roni AGP  report for 14 days and discussed with pt      - 14 day average glucose 99  - % for high and low sugars and % in Target  Range  86%    - percent of utiization 34 %  - CV% 29.%      2. Hypoglycemia :  Educated on treating the hypoglycemia. Discussed insulin use and prescribed    3. HTN : poorly controlled  From ckd -  continue lisinopril 20 mg bid ,  Increase  toprol xl   To  50 mg a day  ,  And  Increase   norvasc to 10 mg a day     4. Dyslipidemia : continue lipitor 40 mg .     5. Diabetic complications :     A. Retinopathy-YES   educated on this complication,  regular f/u with ophthalmologist encouraged    B. Nephropathy - egfr  Is 26  ml/min from June 2021   educated on this disease and indicated stages and its prognosis. Regular care with nephrologist Dr. Juan Pike encouraged    C. Peripheral Neuropathy - NO  ,   educated on this disease and indicated improvement with good and stable glycemic control      6. Thyroid disease : resolved   S/p LAKE therapy for  Goiter  30 mci  -  LAKE therapy ;  Did not respond for LAKE therapy  Patient says  He is EUTHYROID  Without any help based on recent labs       Reviewed results with patient and discussed the labs being ordered today/bnv  Patient voiced understanding of plan of care

## 2021-09-02 NOTE — LETTER
9/6/2021    Patient: Quentin Serna. YOB: 1965   Date of Visit: 9/2/2021     Tasia Castro MD  [address] [place] [address]  Via Fax: [phone]    Dear Tasia Castro MD,      Thank you for referring Mr. Patricia Adams to [facility] DI[facility]OLOG[facility] for evaluation. My notes for this consultation are attached. If you have questions, please do not hesitate to call me. I look forward to following your patient along with you.       Sincerely,    Michelle Flannery MD
Doug Ramey

## 2021-09-03 ENCOUNTER — DOCUMENTATION ONLY (OUTPATIENT)
Dept: ENDOCRINOLOGY | Age: 56
End: 2021-09-03

## 2021-09-03 RX ORDER — PIOGLITAZONEHYDROCHLORIDE 30 MG/1
TABLET ORAL
Qty: 30 TABLET | Refills: 6 | Status: SHIPPED | OUTPATIENT
Start: 2021-09-03 | End: 2021-09-03

## 2021-09-03 RX ORDER — PIOGLITAZONEHYDROCHLORIDE 30 MG/1
TABLET ORAL
Qty: 30 TABLET | Refills: 6 | Status: SHIPPED | OUTPATIENT
Start: 2021-09-03 | End: 2022-05-03

## 2021-09-03 NOTE — PROGRESS NOTES
Please give  patient details  that he has seen Dr. Lake Adame  As his kidney doctor and I saw this doc's name on labs printed out from [address] [place] [address] web site     Erlinda Feliciano MD

## 2021-09-03 NOTE — PROGRESS NOTES
Two pt identifiers confirmed. Clarified with pt Dr. Rogelio Abraham is his nephrologist. Upcoming appt 09/24/2021. Pt states, was informed to provide his contact info to Dr. Ofe Coleman to call if she has any questions. #[phone]    Pt verbalized understanding of information discussed w/ no further questions at this time.

## 2021-11-02 DIAGNOSIS — E78.2 MIXED HYPERLIPIDEMIA: ICD-10-CM

## 2021-11-02 DIAGNOSIS — E11.65 UNCONTROLLED TYPE 2 DIABETES MELLITUS WITH HYPERGLYCEMIA (HCC): ICD-10-CM

## 2021-11-02 DIAGNOSIS — I10 ESSENTIAL HYPERTENSION: ICD-10-CM

## 2021-11-02 RX ORDER — LISINOPRIL 20 MG/1
TABLET ORAL
Qty: 180 TABLET | Refills: 3 | Status: SHIPPED | OUTPATIENT
Start: 2021-11-02

## 2021-12-02 RX ORDER — ATORVASTATIN CALCIUM 40 MG/1
TABLET, FILM COATED ORAL
Qty: 90 TABLET | Refills: 3 | Status: SHIPPED | OUTPATIENT
Start: 2021-12-02

## 2021-12-29 RX ORDER — FLASH GLUCOSE SENSOR
KIT MISCELLANEOUS
Qty: 2 KIT | Refills: 5 | Status: SHIPPED | OUTPATIENT
Start: 2021-12-29 | End: 2022-06-18

## 2022-01-12 ENCOUNTER — OFFICE VISIT (OUTPATIENT)
Dept: ENDOCRINOLOGY | Age: 57
End: 2022-01-12
Payer: COMMERCIAL

## 2022-01-12 VITALS
DIASTOLIC BLOOD PRESSURE: 90 MMHG | HEIGHT: 74 IN | TEMPERATURE: 97 F | SYSTOLIC BLOOD PRESSURE: 169 MMHG | HEART RATE: 85 BPM | BODY MASS INDEX: 28.08 KG/M2 | OXYGEN SATURATION: 98 % | WEIGHT: 218.8 LBS

## 2022-01-12 DIAGNOSIS — E78.2 MIXED HYPERLIPIDEMIA: ICD-10-CM

## 2022-01-12 DIAGNOSIS — I10 ESSENTIAL HYPERTENSION: ICD-10-CM

## 2022-01-12 DIAGNOSIS — E11.65 UNCONTROLLED TYPE 2 DIABETES MELLITUS WITH HYPERGLYCEMIA (HCC): Primary | ICD-10-CM

## 2022-01-12 PROCEDURE — 99214 OFFICE O/P EST MOD 30 MIN: CPT | Performed by: INTERNAL MEDICINE

## 2022-01-12 PROCEDURE — 95251 CONT GLUC MNTR ANALYSIS I&R: CPT | Performed by: INTERNAL MEDICINE

## 2022-01-12 RX ORDER — METOPROLOL SUCCINATE 100 MG/1
TABLET, EXTENDED RELEASE ORAL
Qty: 90 TABLET | Refills: 3 | Status: SHIPPED | OUTPATIENT
Start: 2022-01-12

## 2022-01-12 NOTE — PROGRESS NOTES
Fran Fritz. is a 61 y.o. male here for   Chief Complaint   Patient presents with    Diabetes       1. Have you been to the ER, urgent care clinic since your last visit? Hospitalized since your last visit? -    2. Have you seen or consulted any other health care providers outside of the [address] [place] [address] since your last visit?   Include any pap smears or colon screening.-

## 2022-01-12 NOTE — LETTER
1/12/2022    Patient: Marian Ruiz. YOB: 1965   Date of Visit: 1/12/2022     Mike Hernández MD  [address] [place], [place] [id number]  Via Fax: [phone]    Dear Mike Hernández MD,      Thank you for referring Mr. Victor Manuel Quinones to [facility] DIABETES [facility] ENDOCRINOLOGY for evaluation. My notes for this consultation are attached. If you have questions, please do not hesitate to call me. I look forward to following your patient along with you.       Sincerely,    Tom Fernandez MD

## 2022-01-12 NOTE — PATIENT INSTRUCTIONS
SPECIFIC INSTRUCTIONS BELOW     DRINK water   Do not skip meals  Do not eat in between meals    Reduce carbs- pasta, rice, potatoes, bread   Do not drink juices or sodas, even they are calorie zero or diet drinks  Do not eat peanut butter     Do not eat sugar free cookies and cakes   Do not eat peaches, oranges, pineapples, raisins, grapes , canteloupe , honey dew and fruit medleys    -----------------------------------------------------------------------------    Stay on lisinopril every day      amlodipine   10 mg a day     metoprolol xl  100 mg once a day         -----------------------------------------------------------------------------------------------------------------       Stop Starlix  60 mg      Stay on ozempic    1 mg  a  Week     Stay on actos a day           -------------PAY ATTENTION TO [address] [place], [place] [address] -----------------      - The medications prescribed at this visit will not be available at pharmacy until 6 pm       - YOUR MED LIST IS NOT UP TO DATE AS SOME CHANGES ARE BEING MADE AFTER THE VISIT - FOLLOW SPECIFIC INSTRUCTIONS  ABOVE     -ANY tests other than blood work, which you opt to do  outside the  [facility] imaging facilities, you are responsible for prior authorizations if  required    - 18 Renae Mar UP TO DATE ON YOUR AVS- PLEASE IGNORE     Results     *Normal results will not be notified by a phone call starting January 1 2021   *If you have an upcoming visit, the results will be discussed at the visit   *Please sign up for MY CHART if you want access to your lab and test results  *Abnormal results which require immediate attention will be notified by phone call   *Abnormal results which do not require immediate assistance will be notified in 1-2 weeks       Refills    -    have your pharmacy send us a refill request . Refills are done max for one year and a visit is a must before refills are extended    Follow up appointments -  highly encourage you to make it when you are checking out. We can accommodate you into the schedule based on your clinical situation, but not for extending refills beyond a year. Labs are important to give refills and is important to get labs before the visit     Phone calls  -  Allow  24 hrs.  for non-urgent calls to be returned  Prior authorization - It may take 2-4 weeks to process  Forms  -  FMLA, DMV etc., will take up to 2 weeks to process  Cancellations - please notify the office 2 days in advance   Samples  - will only be dispensed at visits       If not showing for the appointments and cancelling appointments within 24 hours are kept track of and three  of such situations in  two consecutive years will likely be considered for termination from the practice    -------------------------------------------------------------------------------------------------------------------

## 2022-01-12 NOTE — PROGRESS NOTES
HISTORY OF PRESENT ILLNESS  Natalie Marshall is a 61 y.o. male. Follow up for type 2 diabetes   After   Last visit   from sept 2021     He is doing well   Wearing the [facility]       Sept 2021   He is doing well     June 2021   He is using [facility] basic , he says he got hospitalized for  ARF    He saw the nephrologist also   He did nto take starlix , says he did not get refills         July 2020   Referred by PCP   Initial visit for  Thyroid disease   And  Diabetes type 2   Patient here for initial visit of Type 2 diabetes mellitus   H/o diabetes for 25 years   Current A1C is over 11 %   and symptoms/problems include fluctuant sugars   Current diabetic medications include Levemir 34 units at night, Ozempic 1 mg once a week, Actos 30 mg a day and NovoLog which was prescribed as needed    Review of Systems   Constitutional: Negative. Psychiatric/Behavioral: Negative for depression and memory loss. The patient does not have insomnia. Physical Exam  Constitutional: Appearance: He is well-developed. Neurological:    Mental Status: He is alert and oriented to person, place, and time. No new labs       ASSESSMENT and PLAN    1.  Type 2 DM, uncontrolled : a1c is   ?/    No labs   Compared   To     5.7 %   From     Today  By POC   Compared to     7.3 %     From  Today  June 2021   By POC   Compared to      5.4 %     From  Today by POC   ,  Compared to    over 11 %         Jan 2022     He has stage 4 ckd     Reviewed the [facility] on phone, changed the settings   Very good control    98 % all good   - CV% 29.%  Stay on actos, ozempic    Stopped starlix          Sept 2021     He has stage 4 ckd   Very good control   Graph is disconnected     - 14 day average glucose 120  - 13% for high  Over 240   and low sugars and % in Target  Range  94%  ( his target range was set higher 121- 240 )   - percent of utiization 24 %  - CV% 29.%  Stay on actos, ozempic  And   starlix         June 2021     a1c is higher , likely from not taking starlix   He thought he did not have to take starlix as pharmacy had no refills     Stay on ozempic, actos and  Resume  starlix at 60 mg tid   Reviewed   Libriew AGP  report for 14 days and discussed with pt      - 14 day average glucose 99  - 13% for high  Over 240   and low sugars and % in Target  Range  83%  ( his target range was set higher 121- 240 )   - percent of utiization 34 %  - CV% 29.%          2. Hypoglycemia :  Educated on treating the hypoglycemia. Discussed insulin use and prescribed    3. HTN : uncontrolled  From ckd -  continue lisinopril 20 mg bid ,  Increase  toprol xl   To  100 mg a day  ,  And    norvasc to 10 mg a day     4. Dyslipidemia : continue lipitor 40 mg .     5. Diabetic complications :     A. Retinopathy-YES   educated on this complication,  regular f/u with ophthalmologist encouraged    B. Nephropathy - egfr  Is 26  ml/min from June 2021   educated on this disease and indicated stages and its prognosis. Regular care with nephrologist Dr. Suresh Harkins encouraged    C. Peripheral Neuropathy - NO  ,   educated on this disease and indicated improvement with good and stable glycemic control      6. Thyroid disease : resolved   S/p LAKE therapy for  Goiter  30 mci  -  LAKE therapy ;  Did not respond for LAKE therapy  Patient says  He is EUTHYROID  Without any help based on recent labs       Reviewed results with patient and discussed the labs being ordered today/bnv  Patient voiced understanding of plan of care

## 2022-01-13 LAB
ALBUMIN SERPL-MCNC: 3.8 G/DL (ref 3.8–4.9)
ALBUMIN/CREAT UR: 1815 MG/G CREAT (ref 0–29)
ALBUMIN/GLOB SERPL: 1.3 {RATIO} (ref 1.2–2.2)
ALP SERPL-CCNC: 124 IU/L (ref 44–121)
ALT SERPL-CCNC: 14 IU/L (ref 0–44)
AST SERPL-CCNC: 14 IU/L (ref 0–40)
BILIRUB SERPL-MCNC: 0.4 MG/DL (ref 0–1.2)
BUN SERPL-MCNC: 22 MG/DL (ref 6–24)
BUN/CREAT SERPL: 9 (ref 9–20)
CALCIUM SERPL-MCNC: 9 MG/DL (ref 8.7–10.2)
CHLORIDE SERPL-SCNC: 108 MMOL/L (ref 96–106)
CHOLEST SERPL-MCNC: 173 MG/DL (ref 100–199)
CO2 SERPL-SCNC: 21 MMOL/L (ref 20–29)
CREAT SERPL-MCNC: 2.46 MG/DL (ref 0.76–1.27)
CREAT UR-MCNC: 149.7 MG/DL
EST. AVERAGE GLUCOSE BLD GHB EST-MCNC: 100 MG/DL
GLOBULIN SER CALC-MCNC: 3 G/DL (ref 1.5–4.5)
GLUCOSE SERPL-MCNC: 99 MG/DL (ref 65–99)
HBA1C MFR BLD: 5.1 % (ref 4.8–5.6)
HDLC SERPL-MCNC: 49 MG/DL
IMP & REVIEW OF LAB RESULTS: NORMAL
INTERPRETATION: NORMAL
LDLC SERPL CALC-MCNC: 100 MG/DL (ref 0–99)
MICROALBUMIN UR-MCNC: 2717.5 UG/ML
POTASSIUM SERPL-SCNC: 5.4 MMOL/L (ref 3.5–5.2)
PROT SERPL-MCNC: 6.8 G/DL (ref 6–8.5)
SODIUM SERPL-SCNC: 140 MMOL/L (ref 134–144)
TRIGL SERPL-MCNC: 135 MG/DL (ref 0–149)
VLDLC SERPL CALC-MCNC: 24 MG/DL (ref 5–40)

## 2022-05-03 RX ORDER — PIOGLITAZONEHYDROCHLORIDE 30 MG/1
TABLET ORAL
Qty: 30 TABLET | Refills: 6 | Status: SHIPPED | OUTPATIENT
Start: 2022-05-03 | End: 2022-10-27

## 2022-05-26 RX ORDER — SEMAGLUTIDE 1.34 MG/ML
INJECTION, SOLUTION SUBCUTANEOUS
Qty: 9 EACH | Refills: 3 | Status: SHIPPED | OUTPATIENT
Start: 2022-05-26

## 2022-06-18 RX ORDER — FLASH GLUCOSE SENSOR
KIT MISCELLANEOUS
Qty: 2 KIT | Refills: 5 | Status: SHIPPED | OUTPATIENT
Start: 2022-06-18

## 2022-08-10 ENCOUNTER — OFFICE VISIT (OUTPATIENT)
Dept: ENDOCRINOLOGY | Age: 57
End: 2022-08-10
Payer: COMMERCIAL

## 2022-08-10 VITALS
WEIGHT: 214.2 LBS | OXYGEN SATURATION: 97 % | HEART RATE: 81 BPM | TEMPERATURE: 98.2 F | DIASTOLIC BLOOD PRESSURE: 101 MMHG | BODY MASS INDEX: 27.49 KG/M2 | SYSTOLIC BLOOD PRESSURE: 167 MMHG | HEIGHT: 74 IN

## 2022-08-10 DIAGNOSIS — E78.2 MIXED HYPERLIPIDEMIA: ICD-10-CM

## 2022-08-10 DIAGNOSIS — E11.65 UNCONTROLLED TYPE 2 DIABETES MELLITUS WITH HYPERGLYCEMIA (HCC): Primary | ICD-10-CM

## 2022-08-10 DIAGNOSIS — I10 ESSENTIAL HYPERTENSION: ICD-10-CM

## 2022-08-10 DIAGNOSIS — N18.4 CKD (CHRONIC KIDNEY DISEASE) STAGE 4, GFR 15-29 ML/MIN (HCC): ICD-10-CM

## 2022-08-10 LAB
ALBUMIN SERPL-MCNC: 2.6 G/DL (ref 3.5–5)
ALBUMIN/GLOB SERPL: 0.8 {RATIO} (ref 1.1–2.2)
ALP SERPL-CCNC: 75 U/L (ref 45–117)
ALT SERPL-CCNC: 14 U/L (ref 12–78)
ANION GAP SERPL CALC-SCNC: 8 MMOL/L (ref 5–15)
AST SERPL-CCNC: 18 U/L (ref 15–37)
BILIRUB SERPL-MCNC: 0.4 MG/DL (ref 0.2–1)
BUN SERPL-MCNC: 38 MG/DL (ref 6–20)
BUN/CREAT SERPL: 11 (ref 12–20)
CALCIUM SERPL-MCNC: 8.8 MG/DL (ref 8.5–10.1)
CHLORIDE SERPL-SCNC: 111 MMOL/L (ref 97–108)
CHOLEST SERPL-MCNC: 288 MG/DL
CO2 SERPL-SCNC: 22 MMOL/L (ref 21–32)
CREAT SERPL-MCNC: 3.58 MG/DL (ref 0.7–1.3)
CREAT UR-MCNC: 221 MG/DL
EST. AVERAGE GLUCOSE BLD GHB EST-MCNC: 105 MG/DL
GLOBULIN SER CALC-MCNC: 3.4 G/DL (ref 2–4)
GLUCOSE SERPL-MCNC: 122 MG/DL (ref 65–100)
HBA1C MFR BLD: 5.3 % (ref 4–5.6)
HDLC SERPL-MCNC: 56 MG/DL
HDLC SERPL: 5.1 {RATIO} (ref 0–5)
LDLC SERPL CALC-MCNC: 195 MG/DL (ref 0–100)
MICROALBUMIN UR-MCNC: 678 MG/DL
MICROALBUMIN/CREAT UR-RTO: 3068 MG/G (ref 0–30)
POTASSIUM SERPL-SCNC: 5.1 MMOL/L (ref 3.5–5.1)
PROT SERPL-MCNC: 6 G/DL (ref 6.4–8.2)
SODIUM SERPL-SCNC: 141 MMOL/L (ref 136–145)
TRIGL SERPL-MCNC: 185 MG/DL (ref ?–150)
VLDLC SERPL CALC-MCNC: 37 MG/DL

## 2022-08-10 PROCEDURE — 99215 OFFICE O/P EST HI 40 MIN: CPT | Performed by: INTERNAL MEDICINE

## 2022-08-10 PROCEDURE — 3044F HG A1C LEVEL LT 7.0%: CPT | Performed by: INTERNAL MEDICINE

## 2022-08-10 RX ORDER — FLASH GLUCOSE SENSOR
KIT MISCELLANEOUS
Qty: 2 KIT | Refills: 6 | Status: SHIPPED | OUTPATIENT
Start: 2022-08-10

## 2022-08-10 RX ORDER — FLASH GLUCOSE SCANNING READER
EACH MISCELLANEOUS
Qty: 1 EACH | Refills: 0 | Status: SHIPPED | OUTPATIENT
Start: 2022-08-10

## 2022-08-10 NOTE — LETTER
8/10/2022    Patient: Rahul Marroquin. YOB: 1965   Date of Visit: 8/10/2022     Gerardo Churchill MD  [address] [place] [address]  Via Fax: [phone]    Dear Gerardo Churchill MD,      Thank you for referring Mr. Luiz Rose to [facility] DIABETES [facility] ENDOCRINOLOGY for evaluation. My notes for this consultation are attached. If you have questions, please do not hesitate to call me. I look forward to following your patient along with you.       Sincerely,    Tito Brambila MD

## 2022-08-10 NOTE — PATIENT INSTRUCTIONS
SPECIFIC INSTRUCTIONS BELOW       DRINK water   Do not skip meals  Do not eat in between meals    Reduce carbs- pasta, rice, potatoes, bread   Do not drink juices or sodas, even they are calorie zero or diet drinks  Do not eat peanut butter     Do not eat sugar free cookies and cakes   Do not eat peaches, oranges, pineapples, raisins, grapes , canteloupe , honey dew and fruit medleys    -----------------------------------------------------------------------------    Stay on lisinopril every day      amlodipine   10 mg a day     metoprolol xl  100 mg once a day         -----------------------------------------------------------------------------------------------------------------      Stay on ozempic    1 mg  a  Week     Stay on actos a day             -------------PAY ATTENTION TO [address] [place] [address] 58 -----------------      - The medications prescribed at this visit will not be available at pharmacy until 6 pm       - YOUR MED LIST IS NOT UP TO DATE AS SOME CHANGES ARE BEING MADE AFTER THE VISIT - FOLLOW SPECIFIC INSTRUCTIONS  ABOVE     -ANY tests other than blood work, which you opt to do  outside the  [facility] imaging facilities, you are responsible for prior authorizations if  required    - 18 Renae Mar UP TO DATE ON YOUR AVS- PLEASE IGNORE     Results     *Normal results will not be notified by a phone call starting January 1 2021   *If you have an upcoming visit, the results will be discussed at the visit   *Please sign up for MY CHART if you want access to your lab and test results  *Abnormal results which require immediate attention will be notified by phone call   *Abnormal results which do not require immediate assistance will be notified in 1-2 weeks       Refills    -    have your pharmacy send us a refill request . Refills are done max for one year and a visit is a must before refills are extended    Follow up appointments -  highly encourage you to make it when you are checking out. We can accommodate you into the schedule based on your clinical situation, but not for extending refills beyond a year. Labs are important to give refills and is important to get labs before the visit     Phone calls  -  Allow  24 hrs.  for non-urgent calls to be returned  Prior authorization - It may take 2-4 weeks to process  Forms  -  FMLA, DMV etc., will take up to 2 weeks to process  Cancellations - please notify the office 2 days in advance   Samples  - will only be dispensed at visits       If not showing for the appointments and cancelling appointments within 24 hours are kept track of and three  of such situations in  two consecutive years will likely be considered for termination from the practice    -------------------------------------------------------------------------------------------------------------------

## 2022-08-10 NOTE — PROGRESS NOTES
HISTORY OF PRESENT ILLNESS  Luis Lamb is a 61 y.o. male. 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 Psychiatric/Behavioral: Negative for depression and memory loss. The patient does not have insomnia. Physical Exam  Constitutional: Appearance: He is well-developed. Neurological:    Mental Status: He is alert and oriented to person, place, and time. No new labs       ASSESSMENT and PLAN    1.  Type 2 DM, uncontrolled : a1c is   5.1 %      from   August 2022    compared to   5.1 %  from jan 2022     Compared   To     5.7 %   From     Today  By POC   Compared to     7.3 %     From  Today  June 2021   By POC   Compared to      5.4 %     From  Today by POC   ,  Compared to    over 11 %     August 2022     He is doing well ( sugars are better because of  CKD  - )  Stay on actos, ozempic      He seems to be not taking his BP  and renal disease seriously   He has NOT followed his nephro    Reviewed   Librie AGP  report for 14 days and discussed with pt    - 14 day average glucose 97  - 0% for high and   9 %  low sugars and % in Target  Range  88 %    - percent of utiization 19 %  - CV% 25.3%          Jan 2022     He has stage 4 ckd   Reviewed the [facility] on phone, changed the settings   Very good control    98 % all good   - CV% 29.%  Stay on actos, ozempic    Stopped starlix          Sept 2021     He has stage 4 ckd   Very good control   Graph is disconnected     - 14 day average glucose 120  - 13% for high  Over 240   and low sugars and % in Target  Range  94%  ( his target range was set higher 121- 240 )   - percent of utiization 24 %  - CV% 29.%  Stay on actos, ozempic  And   starlix         2. Hypoglycemia :  Educated on treating the hypoglycemia. Discussed insulin use and prescribed    3. HTN : poorly controlled  From ckd -  continue lisinopril 20 mg bid ,    toprol xl   To  100 mg a day  ,  And  counseled to  RESUME  norvasc to 10 mg a day     4. Dyslipidemia : continue lipitor 40 mg .     5. Diabetic complications :     A. Retinopathy-YES   educated on this complication,  regular f/u with ophthalmologist encouraged    B. Nephropathy - egfr  Is 26  ml/min from Jan 2022   educated on this disease and indicated stages and its prognosis. Regular care with nephrologist Dr. Victoria Barros encouraged    C. Peripheral Neuropathy - NO  ,   educated on this disease and indicated improvement with good and stable glycemic control      6. Thyroid disease : resolved   S/p LAKE therapy for  Goiter  30 mci  -  LAKE therapy ;  Did not respond for LAKE therapy  Patient says He is EUTHYROID  Without any help based on recent labs     Counseled him again today  to focus more on BP control, follow ups with nephro   discussed changes and spent more than 25 min in interpretation and counseling     Total time  : 42 min           Reviewed results with patient and discussed the labs being ordered today/bnv  Patient voiced understanding of plan of care

## 2022-08-18 NOTE — PROGRESS NOTES
Called patient and advised him of Dr Yohannes Bautista comments.  He states that he has an upcoming appointment with his nephrologist.

## 2022-10-05 ENCOUNTER — TRANSCRIBE ORDER (OUTPATIENT)
Dept: SCHEDULING | Age: 57
End: 2022-10-05

## 2022-10-05 DIAGNOSIS — N18.5 CHRONIC KIDNEY DISEASE, STAGE V (HCC): Primary | ICD-10-CM

## 2022-10-27 RX ORDER — PIOGLITAZONEHYDROCHLORIDE 30 MG/1
TABLET ORAL
Qty: 30 TABLET | Refills: 6 | Status: SHIPPED | OUTPATIENT
Start: 2022-10-27

## 2022-11-25 DIAGNOSIS — I10 ESSENTIAL HYPERTENSION: ICD-10-CM

## 2022-11-25 DIAGNOSIS — E78.2 MIXED HYPERLIPIDEMIA: ICD-10-CM

## 2022-11-25 DIAGNOSIS — E11.65 UNCONTROLLED TYPE 2 DIABETES MELLITUS WITH HYPERGLYCEMIA (HCC): ICD-10-CM

## 2022-11-25 RX ORDER — LISINOPRIL 20 MG/1
TABLET ORAL
Qty: 180 TABLET | Refills: 3 | Status: SHIPPED | OUTPATIENT
Start: 2022-11-25

## 2022-11-30 RX ORDER — METOPROLOL SUCCINATE 100 MG/1
TABLET, EXTENDED RELEASE ORAL
Qty: 90 TABLET | Refills: 3 | Status: SHIPPED | OUTPATIENT
Start: 2022-11-30

## 2022-12-05 ENCOUNTER — OFFICE VISIT (OUTPATIENT)
Dept: ENDOCRINOLOGY | Age: 57
End: 2022-12-05
Payer: COMMERCIAL

## 2022-12-05 VITALS
DIASTOLIC BLOOD PRESSURE: 64 MMHG | RESPIRATION RATE: 18 BRPM | OXYGEN SATURATION: 99 % | HEART RATE: 63 BPM | SYSTOLIC BLOOD PRESSURE: 116 MMHG | WEIGHT: 215.2 LBS | HEIGHT: 74 IN | BODY MASS INDEX: 27.62 KG/M2 | TEMPERATURE: 98.3 F

## 2022-12-05 DIAGNOSIS — E78.2 MIXED HYPERLIPIDEMIA: ICD-10-CM

## 2022-12-05 DIAGNOSIS — E11.65 UNCONTROLLED TYPE 2 DIABETES MELLITUS WITH HYPERGLYCEMIA (HCC): Primary | ICD-10-CM

## 2022-12-05 DIAGNOSIS — I10 ESSENTIAL HYPERTENSION: ICD-10-CM

## 2022-12-05 DIAGNOSIS — N18.4 CKD (CHRONIC KIDNEY DISEASE) STAGE 4, GFR 15-29 ML/MIN (HCC): ICD-10-CM

## 2022-12-05 LAB — HBA1C MFR BLD HPLC: 5.6 %

## 2022-12-05 PROCEDURE — 3044F HG A1C LEVEL LT 7.0%: CPT | Performed by: INTERNAL MEDICINE

## 2022-12-05 PROCEDURE — 83036 HEMOGLOBIN GLYCOSYLATED A1C: CPT | Performed by: INTERNAL MEDICINE

## 2022-12-05 PROCEDURE — 99214 OFFICE O/P EST MOD 30 MIN: CPT | Performed by: INTERNAL MEDICINE

## 2022-12-05 PROCEDURE — 3078F DIAST BP <80 MM HG: CPT | Performed by: INTERNAL MEDICINE

## 2022-12-05 PROCEDURE — 95251 CONT GLUC MNTR ANALYSIS I&R: CPT | Performed by: INTERNAL MEDICINE

## 2022-12-05 PROCEDURE — 3074F SYST BP LT 130 MM HG: CPT | Performed by: INTERNAL MEDICINE

## 2022-12-05 RX ORDER — ASPIRIN 81 MG/1
TABLET ORAL DAILY
COMMUNITY

## 2022-12-05 RX ORDER — ATORVASTATIN CALCIUM 40 MG/1
40 TABLET, FILM COATED ORAL
Qty: 90 TABLET | Refills: 3 | Status: SHIPPED | OUTPATIENT
Start: 2022-12-05

## 2022-12-05 RX ORDER — FLASH GLUCOSE SENSOR
KIT MISCELLANEOUS
Qty: 2 KIT | Refills: 5 | Status: SHIPPED | OUTPATIENT
Start: 2022-12-05

## 2022-12-05 RX ORDER — BUMETANIDE 2 MG/1
2 TABLET ORAL DAILY
COMMUNITY

## 2022-12-05 RX ORDER — HYDRALAZINE HYDROCHLORIDE 100 MG/1
100 TABLET, FILM COATED ORAL 3 TIMES DAILY
COMMUNITY

## 2022-12-05 NOTE — LETTER
12/5/2022    Patient: Ori Rojas. YOB: 1965   Date of Visit: 12/5/2022     Edith Caruso MD  [address] [place] [address]  Via Fax: [phone]    Dear Edith Caruso MD,      Thank you for referring Mr. Christia Bumpers to [facility] DIABET[facility] ENDOCRINOLOGY for evaluation. My notes for this consultation are attached. If you have questions, please do not hesitate to call me. I look forward to following your patient along with you.       Sincerely,    Alexandro Linder MD

## 2022-12-05 NOTE — PROGRESS NOTES
HISTORY OF PRESENT ILLNESS  Peter Neri is a 61 y.o. male. Follow up for type 2 diabetes   After   Last visit   from August 2022   He ran out  metoprolol a week ago , but BP is good   He has question reg metoprolol   He has been seeing Dr. 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 Psychiatric/Behavioral: Negative for depression and memory loss. The patient does not have insomnia. Physical Exam  Constitutional: Appearance: He is well-developed. Neurological:    Mental Status: He is alert and oriented to person, place, and time. Labs reviewed from nov 2022  , from his nephro       ASSESSMENT and PLAN    1. Type 2 DM, uncontrolled : a1c is ?   Compared to   5.1 %      from   August 2022    compared to   5.1 %  from jan 2022     Compared   To     5.7 %   From     Today  By POC   Compared to     7.3 %     From  Today  June 2021   By POC   Compared to      5.4 %     From  Today by POC   ,  Compared to    over 11 %       Dec 2022     I notice he is following nephro regularly since last time   Seems more connected to his CKD than beffore   He is NOT wearing  vivian/ CGM right now     He is only taking actos  and ozempic     Reviewed   [facility] AGP  report for 14 days and discussed with pt    - 14 day average glucose 106  - GMI 6 %  - 0% for high and   9 %  low sugars and % in Target  Range 100 % - percent of utiization 21 %  - CV% 25.3%          August 2022     He is doing well ( sugars are better because of  CKD  - )  Stay on actos, ozempic      He seems to be not taking his BP  and renal disease seriously   He has NOT followed his nephro    [facility]  [facility] for 14 days and discussed with pt    - 14 day average glucose 97  - 0% for high and   9 %  low sugars and % in Target  Range  88 %    - percent of utiization 19 %  - CV% 25.3%          Jan 2022     He has stage 4 ckd   Reviewed the [place] on phone, changed the settings   Very good control    98 % all good   - CV% 29.%  Stay on actos, ozempic    Stopped starlix        2. Hypoglycemia :  Educated on treating the hypoglycemia. Discussed insulin use and prescribed    3. HTN : very well  controlled   -  continue lisinopril 20 mg bid ,   and  hydralazine 100 mg tid   And   toprol xl   To  100 mg a day  (  he has been off it  for a week  )  - he may not need this but low dose could be good given a risk for CAD  - pt will be seeing Dr. Harsha Murdock this afternoon - and he will take over the refills     4. Dyslipidemia : continue lipitor 40 mg .     5. Diabetic complications :     A. Retinopathy-YES   educated on this complication,  regular f/u with ophthalmologist encouraged    B. Nephropathy - egfr  13   ml/min from nov 2022 - compared to 26  from jan 2022   educated on this disease and indicated stages and its prognosis. Regular care with nephrologist Dr. Hasrha Murdock encouraged    C. Peripheral Neuropathy - NO  ,   educated on this disease and indicated improvement with good and stable glycemic control      6. Thyroid disease : resolved   S/p LAKE therapy for  Goiter  30 mci  -  LAKE therapy ;  Did not respond for LAKE therapy  Patient says He is EUTHYROID  Without any help based on recent labs       Reviewed results with patient and discussed the labs being ordered today/bnv  Patient voiced understanding of plan of care Reviewed results with patient and discussed the labs being ordered today/bnv  Patient voiced understanding of plan of care

## 2022-12-05 NOTE — PATIENT INSTRUCTIONS
SPECIFIC INSTRUCTIONS BELOW       On vivian 14 day still       DRINK water   Do not skip meals  Do not eat in between meals    Reduce carbs- pasta, rice, potatoes, bread   Do not drink juices or sodas, even they are calorie zero or diet drinks  Do not eat peanut butter     Do not eat sugar free cookies and cakes   Do not eat peaches, oranges, pineapples, raisins, grapes , canteloupe , honey dew and fruit medleys    -----------------------------------------------------------------------------      metoprolol xl  100 mg once a day   (       Dr. Neida Mast - he has been off of it for a week and still BP is good   Do you want to start on lesser doses for ?  CAD asymptomatic )        -----------------------------------------------------------------------------------------------------------------  RESUME  lipitor asap     Stay on ozempic    1 mg  a  Week     Stay on actos a day                   -------------PAY ATTENTION TO [address] [place] [address] -----------------      - The medications prescribed at this visit will not be available at pharmacy until 6 pm       - YOUR MED LIST IS NOT UP TO DATE AS SOME CHANGES ARE BEING MADE AFTER THE VISIT - FOLLOW SPECIFIC INSTRUCTIONS  ABOVE     -ANY tests other than blood work, which you opt to do  outside the  [facility] imaging facilities, you are responsible for prior authorizations if  required    - [address] [facility]- PLEASE IGNORE     Results     *Normal results will not be notified by a phone call starting January 1 2021   *If you have an upcoming visit, the results will be discussed at the visit   *Please sign up for MY CHART if you want access to your lab and test results  *Abnormal results which require immediate attention will be notified by phone call   *Abnormal results which do not require immediate assistance will be notified in 1-2 weeks       Refills    -    have your pharmacy send us a refill request . Refills are done max for one year and a visit is a must before refills are extended    Follow up appointments -  highly encourage you to make it when you are checking out. We can accommodate you into the schedule based on your clinical situation, but not for extending refills beyond a year. Labs are important to give refills and is important to get labs before the visit     Phone calls  -  Allow  24 hrs.  for non-urgent calls to be returned  Prior authorization - It may take 2-4 weeks to process  Forms  -  FMLA, DMV etc., will take up to 2 weeks to process  Cancellations - please notify the office 2 days in advance   Samples  - will only be dispensed at visits       If not showing for the appointments and cancelling appointments within 24 hours are kept track of and three  of such situations in  two consecutive years will likely be considered for termination from the practice    -------------------------------------------------------------------------------------------------------------------

## 2022-12-05 NOTE — PROGRESS NOTES
Gregory Scott. is a 61 y.o. male here for   Chief Complaint   Patient presents with    Diabetes       1. Have you been to the ER or an urgent care clinic since your last visit?  - no    2. Have you been hospitalized since your last visit? - no    3. Have you seen or consulted any other health care providers outside of the [address] [place] [address] since your last visit?   Include any pap smears or colon screening.- no

## 2023-04-21 DIAGNOSIS — N18.5 CHRONIC KIDNEY DISEASE, STAGE V (HCC): Primary | ICD-10-CM

## 2023-05-07 ENCOUNTER — APPOINTMENT (OUTPATIENT)
Facility: HOSPITAL | Age: 58
End: 2023-05-07
Payer: COMMERCIAL

## 2023-05-07 ENCOUNTER — HOSPITAL ENCOUNTER (OUTPATIENT)
Facility: HOSPITAL | Age: 58
Setting detail: OBSERVATION
LOS: 2 days | Discharge: HOME | End: 2023-05-09
Attending: EMERGENCY MEDICINE | Admitting: INTERNAL MEDICINE
Payer: COMMERCIAL

## 2023-05-07 PROBLEM — I95.9 HYPOTENSION: Status: ACTIVE | Noted: 2023-05-07

## 2023-05-07 LAB
ALBUMIN SERPL-MCNC: 3.2 G/DL (ref 3.5–5)
ALBUMIN/GLOB SERPL: 0.8 (ref 1.1–2.2)
ALP SERPL-CCNC: 81 U/L (ref 45–117)
ALT SERPL-CCNC: 23 U/L (ref 12–78)
ANION GAP SERPL CALC-SCNC: 8 MMOL/L (ref 5–15)
AST SERPL W P-5'-P-CCNC: 12 U/L (ref 15–37)
BASOPHILS # BLD: 0.1 K/UL (ref 0–0.1)
BASOPHILS NFR BLD: 1 % (ref 0–1)
BILIRUB SERPL-MCNC: 0.3 MG/DL (ref 0.2–1)
BUN SERPL-MCNC: 106 MG/DL (ref 6–20)
BUN/CREAT SERPL: 15 (ref 12–20)
CA-I BLD-MCNC: 8.7 MG/DL (ref 8.5–10.1)
CHLORIDE SERPL-SCNC: 107 MMOL/L (ref 97–108)
CO2 SERPL-SCNC: 20 MMOL/L (ref 21–32)
CREAT SERPL-MCNC: 6.87 MG/DL (ref 0.7–1.3)
DIFFERENTIAL METHOD BLD: ABNORMAL
EOSINOPHIL # BLD: 0.3 K/UL (ref 0–0.4)
EOSINOPHIL NFR BLD: 6 % (ref 0–7)
ERYTHROCYTE [DISTWIDTH] IN BLOOD BY AUTOMATED COUNT: 11.7 % (ref 11.5–14.5)
GLOBULIN SER CALC-MCNC: 3.8 G/DL (ref 2–4)
GLUCOSE BLD STRIP.AUTO-MCNC: 177 MG/DL (ref 65–100)
GLUCOSE SERPL-MCNC: 94 MG/DL (ref 65–100)
HCT VFR BLD AUTO: 28.2 % (ref 36.6–50.3)
HGB BLD-MCNC: 9.4 G/DL (ref 12.1–17)
IMM GRANULOCYTES # BLD AUTO: 0 K/UL (ref 0–0.04)
IMM GRANULOCYTES NFR BLD AUTO: 0 % (ref 0–0.5)
LYMPHOCYTES # BLD: 1.3 K/UL (ref 0.8–3.5)
LYMPHOCYTES NFR BLD: 25 % (ref 12–49)
MCH RBC QN AUTO: 31.6 PG (ref 26–34)
MCHC RBC AUTO-ENTMCNC: 33.3 G/DL (ref 30–36.5)
MCV RBC AUTO: 94.9 FL (ref 80–99)
MONOCYTES # BLD: 0.4 K/UL (ref 0–1)
MONOCYTES NFR BLD: 8 % (ref 5–13)
NEUTS SEG # BLD: 3.2 K/UL (ref 1.8–8)
NEUTS SEG NFR BLD: 60 % (ref 32–75)
NRBC # BLD: 0 K/UL (ref 0–0.01)
NRBC BLD-RTO: 0 PER 100 WBC
PERFORMED BY:: ABNORMAL
PLATELET # BLD AUTO: 231 K/UL (ref 150–400)
PMV BLD AUTO: 10.2 FL (ref 8.9–12.9)
POTASSIUM SERPL-SCNC: 5 MMOL/L (ref 3.5–5.1)
PROT SERPL-MCNC: 7 G/DL (ref 6.4–8.2)
RBC # BLD AUTO: 2.97 M/UL (ref 4.1–5.7)
SODIUM SERPL-SCNC: 135 MMOL/L (ref 136–145)
WBC # BLD AUTO: 5.4 K/UL (ref 4.1–11.1)

## 2023-05-07 PROCEDURE — 2580000003 HC RX 258: Performed by: INTERNAL MEDICINE

## 2023-05-07 PROCEDURE — 36415 COLL VENOUS BLD VENIPUNCTURE: CPT

## 2023-05-07 PROCEDURE — 1100000000 HC RM PRIVATE

## 2023-05-07 PROCEDURE — 82962 GLUCOSE BLOOD TEST: CPT

## 2023-05-07 PROCEDURE — 85025 COMPLETE CBC W/AUTO DIFF WBC: CPT

## 2023-05-07 PROCEDURE — 2580000003 HC RX 258: Performed by: EMERGENCY MEDICINE

## 2023-05-07 PROCEDURE — 99285 EMERGENCY DEPT VISIT HI MDM: CPT

## 2023-05-07 PROCEDURE — 80053 COMPREHEN METABOLIC PANEL: CPT

## 2023-05-07 PROCEDURE — 93005 ELECTROCARDIOGRAM TRACING: CPT

## 2023-05-07 PROCEDURE — 71045 X-RAY EXAM CHEST 1 VIEW: CPT

## 2023-05-07 PROCEDURE — 6360000002 HC RX W HCPCS: Performed by: INTERNAL MEDICINE

## 2023-05-07 RX ORDER — ONDANSETRON 4 MG/1
4 TABLET, ORALLY DISINTEGRATING ORAL EVERY 8 HOURS PRN
Status: DISCONTINUED | OUTPATIENT
Start: 2023-05-07 | End: 2023-05-09 | Stop reason: HOSPADM

## 2023-05-07 RX ORDER — POLYETHYLENE GLYCOL 3350 17 G/17G
17 POWDER, FOR SOLUTION ORAL DAILY PRN
Status: DISCONTINUED | OUTPATIENT
Start: 2023-05-07 | End: 2023-05-09 | Stop reason: HOSPADM

## 2023-05-07 RX ORDER — SODIUM CHLORIDE 0.9 % (FLUSH) 0.9 %
5-40 SYRINGE (ML) INJECTION PRN
Status: DISCONTINUED | OUTPATIENT
Start: 2023-05-07 | End: 2023-05-09 | Stop reason: HOSPADM

## 2023-05-07 RX ORDER — ASPIRIN 81 MG/1
81 TABLET ORAL DAILY
Status: DISCONTINUED | OUTPATIENT
Start: 2023-05-08 | End: 2023-05-07

## 2023-05-07 RX ORDER — INSULIN LISPRO 100 [IU]/ML
0-4 INJECTION, SOLUTION INTRAVENOUS; SUBCUTANEOUS
Status: DISCONTINUED | OUTPATIENT
Start: 2023-05-08 | End: 2023-05-09 | Stop reason: HOSPADM

## 2023-05-07 RX ORDER — INSULIN LISPRO 100 [IU]/ML
0-4 INJECTION, SOLUTION INTRAVENOUS; SUBCUTANEOUS NIGHTLY
Status: DISCONTINUED | OUTPATIENT
Start: 2023-05-07 | End: 2023-05-09 | Stop reason: HOSPADM

## 2023-05-07 RX ORDER — ACETAMINOPHEN 325 MG/1
650 TABLET ORAL EVERY 6 HOURS PRN
Status: DISCONTINUED | OUTPATIENT
Start: 2023-05-07 | End: 2023-05-09 | Stop reason: HOSPADM

## 2023-05-07 RX ORDER — SODIUM CHLORIDE 9 MG/ML
INJECTION, SOLUTION INTRAVENOUS PRN
Status: DISCONTINUED | OUTPATIENT
Start: 2023-05-07 | End: 2023-05-09 | Stop reason: HOSPADM

## 2023-05-07 RX ORDER — ACETAMINOPHEN 650 MG/1
650 SUPPOSITORY RECTAL EVERY 6 HOURS PRN
Status: DISCONTINUED | OUTPATIENT
Start: 2023-05-07 | End: 2023-05-09 | Stop reason: HOSPADM

## 2023-05-07 RX ORDER — 0.9 % SODIUM CHLORIDE 0.9 %
1000 INTRAVENOUS SOLUTION INTRAVENOUS ONCE
Status: COMPLETED | OUTPATIENT
Start: 2023-05-07 | End: 2023-05-07

## 2023-05-07 RX ORDER — HEPARIN SODIUM 5000 [USP'U]/ML
5000 INJECTION, SOLUTION INTRAVENOUS; SUBCUTANEOUS EVERY 8 HOURS SCHEDULED
Status: DISCONTINUED | OUTPATIENT
Start: 2023-05-07 | End: 2023-05-09 | Stop reason: HOSPADM

## 2023-05-07 RX ORDER — SODIUM CHLORIDE 0.9 % (FLUSH) 0.9 %
5-40 SYRINGE (ML) INJECTION EVERY 12 HOURS SCHEDULED
Status: DISCONTINUED | OUTPATIENT
Start: 2023-05-07 | End: 2023-05-09 | Stop reason: HOSPADM

## 2023-05-07 RX ORDER — ATORVASTATIN CALCIUM 40 MG/1
40 TABLET, FILM COATED ORAL DAILY
Status: DISCONTINUED | OUTPATIENT
Start: 2023-05-08 | End: 2023-05-07

## 2023-05-07 RX ORDER — ONDANSETRON 2 MG/ML
4 INJECTION INTRAMUSCULAR; INTRAVENOUS EVERY 6 HOURS PRN
Status: DISCONTINUED | OUTPATIENT
Start: 2023-05-07 | End: 2023-05-09 | Stop reason: HOSPADM

## 2023-05-07 RX ADMIN — HEPARIN SODIUM 5000 UNITS: 5000 INJECTION INTRAVENOUS; SUBCUTANEOUS at 23:09

## 2023-05-07 RX ADMIN — SODIUM CHLORIDE, PRESERVATIVE FREE 10 ML: 5 INJECTION INTRAVENOUS at 23:10

## 2023-05-07 RX ADMIN — SODIUM CHLORIDE 1000 ML: 9 INJECTION, SOLUTION INTRAVENOUS at 20:40

## 2023-05-07 ASSESSMENT — PAIN SCALES - GENERAL: PAINLEVEL_OUTOF10: 0

## 2023-05-07 ASSESSMENT — PAIN - FUNCTIONAL ASSESSMENT: PAIN_FUNCTIONAL_ASSESSMENT: 0-10

## 2023-05-08 LAB
ANION GAP SERPL CALC-SCNC: 3 MMOL/L (ref 5–15)
BASOPHILS # BLD: 0.1 K/UL (ref 0–0.1)
BASOPHILS NFR BLD: 1 % (ref 0–1)
BUN SERPL-MCNC: 110 MG/DL (ref 6–20)
BUN/CREAT SERPL: 18 (ref 12–20)
CA-I BLD-MCNC: 8.1 MG/DL (ref 8.5–10.1)
CHLORIDE SERPL-SCNC: 111 MMOL/L (ref 97–108)
CO2 SERPL-SCNC: 21 MMOL/L (ref 21–32)
CREAT SERPL-MCNC: 5.99 MG/DL (ref 0.7–1.3)
DIFFERENTIAL METHOD BLD: ABNORMAL
EKG ATRIAL RATE: 76 BPM
EKG DIAGNOSIS: NORMAL
EKG P AXIS: 72 DEGREES
EKG P-R INTERVAL: 186 MS
EKG Q-T INTERVAL: 366 MS
EKG QRS DURATION: 92 MS
EKG QTC CALCULATION (BAZETT): 411 MS
EKG R AXIS: -6 DEGREES
EKG T AXIS: 31 DEGREES
EKG VENTRICULAR RATE: 76 BPM
EOSINOPHIL # BLD: 0.4 K/UL (ref 0–0.4)
EOSINOPHIL NFR BLD: 9 % (ref 0–7)
ERYTHROCYTE [DISTWIDTH] IN BLOOD BY AUTOMATED COUNT: 11.7 % (ref 11.5–14.5)
EST. AVERAGE GLUCOSE BLD GHB EST-MCNC: 105 MG/DL
GLUCOSE BLD STRIP.AUTO-MCNC: 117 MG/DL (ref 65–100)
GLUCOSE BLD STRIP.AUTO-MCNC: 121 MG/DL (ref 65–100)
GLUCOSE BLD STRIP.AUTO-MCNC: 133 MG/DL (ref 65–100)
GLUCOSE BLD STRIP.AUTO-MCNC: 137 MG/DL (ref 65–100)
GLUCOSE SERPL-MCNC: 91 MG/DL (ref 65–100)
HBA1C MFR BLD: 5.3 % (ref 4–5.6)
HCT VFR BLD AUTO: 26.7 % (ref 36.6–50.3)
HGB BLD-MCNC: 8.8 G/DL (ref 12.1–17)
IMM GRANULOCYTES # BLD AUTO: 0 K/UL (ref 0–0.04)
IMM GRANULOCYTES NFR BLD AUTO: 0 % (ref 0–0.5)
LYMPHOCYTES # BLD: 1.6 K/UL (ref 0.8–3.5)
LYMPHOCYTES NFR BLD: 36 % (ref 12–49)
MCH RBC QN AUTO: 31.1 PG (ref 26–34)
MCHC RBC AUTO-ENTMCNC: 33 G/DL (ref 30–36.5)
MCV RBC AUTO: 94.3 FL (ref 80–99)
MONOCYTES # BLD: 0.4 K/UL (ref 0–1)
MONOCYTES NFR BLD: 9 % (ref 5–13)
NEUTS SEG # BLD: 2 K/UL (ref 1.8–8)
NEUTS SEG NFR BLD: 45 % (ref 32–75)
NRBC # BLD: 0 K/UL (ref 0–0.01)
NRBC BLD-RTO: 0 PER 100 WBC
PERFORMED BY:: ABNORMAL
PLATELET # BLD AUTO: 216 K/UL (ref 150–400)
PMV BLD AUTO: 11.3 FL (ref 8.9–12.9)
POTASSIUM SERPL-SCNC: 4.6 MMOL/L (ref 3.5–5.1)
RBC # BLD AUTO: 2.83 M/UL (ref 4.1–5.7)
SODIUM SERPL-SCNC: 135 MMOL/L (ref 136–145)
WBC # BLD AUTO: 4.5 K/UL (ref 4.1–11.1)

## 2023-05-08 PROCEDURE — 2580000003 HC RX 258: Performed by: INTERNAL MEDICINE

## 2023-05-08 PROCEDURE — 6370000000 HC RX 637 (ALT 250 FOR IP): Performed by: INTERNAL MEDICINE

## 2023-05-08 PROCEDURE — 1100000000 HC RM PRIVATE

## 2023-05-08 PROCEDURE — 85025 COMPLETE CBC W/AUTO DIFF WBC: CPT

## 2023-05-08 PROCEDURE — 82668 ASSAY OF ERYTHROPOIETIN: CPT

## 2023-05-08 PROCEDURE — 80048 BASIC METABOLIC PNL TOTAL CA: CPT

## 2023-05-08 PROCEDURE — 36415 COLL VENOUS BLD VENIPUNCTURE: CPT

## 2023-05-08 PROCEDURE — 82962 GLUCOSE BLOOD TEST: CPT

## 2023-05-08 PROCEDURE — 83036 HEMOGLOBIN GLYCOSYLATED A1C: CPT

## 2023-05-08 PROCEDURE — 6360000002 HC RX W HCPCS: Performed by: INTERNAL MEDICINE

## 2023-05-08 PROCEDURE — 83970 ASSAY OF PARATHORMONE: CPT

## 2023-05-08 RX ORDER — SODIUM CHLORIDE 9 MG/ML
INJECTION, SOLUTION INTRAVENOUS CONTINUOUS
Status: DISPENSED | OUTPATIENT
Start: 2023-05-08 | End: 2023-05-08

## 2023-05-08 RX ORDER — HYDRALAZINE HYDROCHLORIDE 25 MG/1
25 TABLET, FILM COATED ORAL EVERY 8 HOURS SCHEDULED
Status: DISCONTINUED | OUTPATIENT
Start: 2023-05-08 | End: 2023-05-08

## 2023-05-08 RX ORDER — HYDRALAZINE HYDROCHLORIDE 50 MG/1
50 TABLET, FILM COATED ORAL EVERY 8 HOURS SCHEDULED
Status: DISCONTINUED | OUTPATIENT
Start: 2023-05-08 | End: 2023-05-09 | Stop reason: HOSPADM

## 2023-05-08 RX ORDER — SODIUM CHLORIDE 9 MG/ML
INJECTION, SOLUTION INTRAVENOUS CONTINUOUS
Status: DISPENSED | OUTPATIENT
Start: 2023-05-08 | End: 2023-05-09

## 2023-05-08 RX ADMIN — SODIUM CHLORIDE: 9 INJECTION, SOLUTION INTRAVENOUS at 20:58

## 2023-05-08 RX ADMIN — HYDRALAZINE HYDROCHLORIDE 50 MG: 50 TABLET, FILM COATED ORAL at 21:00

## 2023-05-08 RX ADMIN — SODIUM CHLORIDE: 9 INJECTION, SOLUTION INTRAVENOUS at 05:50

## 2023-05-08 RX ADMIN — SODIUM CHLORIDE, PRESERVATIVE FREE 10 ML: 5 INJECTION INTRAVENOUS at 09:16

## 2023-05-08 RX ADMIN — HYDRALAZINE HYDROCHLORIDE 50 MG: 50 TABLET, FILM COATED ORAL at 14:23

## 2023-05-08 RX ADMIN — HEPARIN SODIUM 5000 UNITS: 5000 INJECTION INTRAVENOUS; SUBCUTANEOUS at 14:23

## 2023-05-08 RX ADMIN — HEPARIN SODIUM 5000 UNITS: 5000 INJECTION INTRAVENOUS; SUBCUTANEOUS at 05:54

## 2023-05-08 RX ADMIN — HYDRALAZINE HYDROCHLORIDE 25 MG: 25 TABLET, FILM COATED ORAL at 05:50

## 2023-05-08 RX ADMIN — HEPARIN SODIUM 5000 UNITS: 5000 INJECTION INTRAVENOUS; SUBCUTANEOUS at 21:00

## 2023-05-08 RX ADMIN — SODIUM CHLORIDE, PRESERVATIVE FREE 10 ML: 5 INJECTION INTRAVENOUS at 21:01

## 2023-05-08 ASSESSMENT — PAIN SCALES - GENERAL
PAINLEVEL_OUTOF10: 0
PAINLEVEL_OUTOF10: 0

## 2023-05-08 NOTE — PROGRESS NOTES
HOSPITALIST PROGRESS NOTE  Baylee Benson MD, [facility]  [address]  [address]         Daily Progress Note: 5/8/2023    H&P on 5/7/2023  Chantal Wakefield is a 61 y.o. male with PMH of hypertension, diabetes and CKD. He presented to the ED with chief complaint of pre-syncope episodes. He was working on his back yard under the hot sun and was sweating profusely yesterday. Subsequently had and episode of lightheadedness after bending down, did not loss consciousness. He admits to not drinking water and only drink gatorade. Also has CKD, recently started on hydralazine and bumex by his nephrology. In the ED, initially hypotensive, improved with IVF resuscitation. Lab work significant for MOLINA, creatinine 6.8, baseline 3.5. Subjective:     Patient is alert oriented x4 with wife at bedside. Both patient and wife are quite concerned as to diagnosis of acute renal failure. Apparently patient placed on 2 new blood pressure medications in February after follow-up with nephrology in [place]. Over the past 1 week patient has noted significantly low blood pressure at home with systolics in the 90M. Frequent dizziness on 5/6/2023 and patient worked all day in his backyard as well. Patient noted to have chronic renal failure stage IV with baseline creatinine of 3 and outpatient follow-up with nephrology. No recent nausea/vomiting, abdominal pain, diarrhea, melena, hematochezia. Counseled on acute on chronic renal failure with need for further IV fluid resuscitation and nephrology consultation. Patient acknowledges understanding.       Medications reviewed  Current Facility-Administered Medications   Medication Dose Route Frequency    hydrALAZINE (APRESOLINE) tablet 25 mg  25 mg Oral 3 times per day    0.9 % sodium chloride infusion   IntraVENous Continuous    insulin lispro (HUMALOG) injection vial 0-4 Units  0-4 Units SubCUTAneous

## 2023-05-08 NOTE — H&P
History and Physical    Patient: Cindi Petty. MRN: [id number]  SSN: [id number]    YOB: 1965  Age: 61 y.o. Sex: male      Subjective:      Cindi Cook is a 61 y.o. male with PMH of hypertension, diabetes and CKD. He presented to the ED with chief complaint of pre-syncope episodes. He was working on his back yard under the hot sun and was sweating profusely yesterday. Subsequently had and episode of lightheadedness after bending down, did not loss consciousness. He admits to not drinking water and only drink gatorade. Also has CKD, recently started on hydralazine and bumex by his nephrology. In the ED, initially hypotensive, improved with IVF resuscitation. Lab work significant for MOLINA, creatinine 6.8, baseline 3.5. Chart review: none    Past Medical History:   Diagnosis Date    Albuminuria     Hypertension     Type 2 diabetes mellitus ([facility] [address]     1995     Family History   Problem Relation Age of Onset    Thyroid Disease Mother         had BARRIENTOS    Diabetes Neg Hx      Social History     Tobacco Use    Smoking status: Never    Smokeless tobacco: Never   Substance Use Topics    Alcohol use: Yes     Alcohol/week: 0.0 standard drinks        Objective:     Physical Exam:     General: alert, cooperative, no distress  Eye: conjunctivae/corneas clear. PERRL, EOM's intact. Throat and Neck: normal and no erythema or exudates noted. No mass   Lung: clear to auscultation bilaterally  Heart: regular rate and rhythm,   Abdomen: soft, non-tender. Bowel sounds normal. No masses,  Extremities: No LE edema. able to move all extremities normal, atraumatic  Skin: Normal.  Neurologic: AOx3. Motor function and sensation grossly intact. Psychiatric: non focal    Most recent lab work and imaging results reviewed in EMR. Assessment and plan:   # MOLINA on CKD stage IV (eGRR 15-29)  - Likely secondary to dehydration and new anti-hypertensive medications.    - consult nephrology   - Hold off further

## 2023-05-08 NOTE — CONSULTS
Renal Consultation Note    NAME:  Ko Vieira :   1965   MRN:   [id number]     ATTENDING: Lluvia Bettencourt MD  PCP:  Ismael Richmond MD    Date/Time:  2023 12:30 PM      Subjective:   REQUESTING PHYSICIAN:  REASON FOR CONSULT:    zane    Patient is a 59-year-old Afro-American male with history of diabetes hypertension, CKD stage V who follows with Dr. Lyndon Gilmore with [facility], who presented to the ED because of dizziness and presyncopal episode. According to patient he was working and felt dizzy and lightheaded which he admitted to hot sun.  however he did not feel better so he came in.  on admission he was found to have a creatinine of 6.8 which prompted a renal consultation. patient claims his creatinine last week nephrologist was 4.0 in 2023  He was recently started on Bumex by nephrology. Also started on hydralazine for blood pressure control. Was also on lisinopril prior to admission  His blood pressure was low on admission in the 90s range. The patient is a pressure has been in this range for the past 3 months    Patient received fluid bolus in the ED and is currently on normal saline at 100 mils per hour. Creatinine today is improved to 5.99  Patient seen in the room. Wife is at bedside. Patient reports feeling better today. Has not had any dizziness or lightheadedness since this morning  Reports good urine output. Per patient urine output has been low prior to admission      Past Medical History:   Diagnosis Date    Albuminuria     Hypertension     Type 2 diabetes mellitus ([facility] [address]           Past Surgical History:   Procedure Laterality Date    SEPTOPLASTY       Social History     Tobacco Use    Smoking status: Never    Smokeless tobacco: Never   Substance Use Topics    Alcohol use:  Yes     Alcohol/week: 0.0 standard drinks      Family History   Problem Relation Age of Onset    Thyroid Disease Mother         had BARRIENTOS    Diabetes Neg Hx        Allergies

## 2023-05-08 NOTE — PROGRESS NOTES
OT orders received and acknowledged. Pt reports baseline IND w/ no concerns. Pt ambulating to bathroom without LOB or dizziness. Pt currently has no skilled acute care OT needs, please reorder if status changes. Current OT orders discontinued at this time. Thank you. Functional Measure:  [facility] AM-PACTM \"6 Clicks\"                                                       Daily Activity Inpatient Short Form  How much help from another person does the patient currently need. .. Total; A Lot A Little None   1. Putting on and taking off regular lower body clothing? []  1 []  2 []  3 [x]  4   2. Bathing (including washing, rinsing, drying)? []  1 []  2 []  3 [x]  4   3. Toileting, which includes using toilet, bedpan or urinal? [] 1 []  2 []  3 [x]  4   4. Putting on and taking off regular upper body clothing? []  1 []  2 []  3 [x]  4   5. Taking care of personal grooming such as brushing teeth? []  1 []  2 []  3 [x]  4   6. Eating meals? []  1 []  2 []  3 [x]  4   © 2007, Trustees of [facility], under license to [employer].  All rights reserved     Score: 24/24

## 2023-05-08 NOTE — ED TRIAGE NOTES
Pt was seen at [facility] before coming here and c/o hypotension of 90/47 at [facility] and dizziness for one day.

## 2023-05-08 NOTE — ED NOTES
Orthostatic Vitals  2012  Lying  P:71  BP:125/71    2015  Sitting  P:71  BP: 109/75    2018  Standing  P:82  BP:95/63       Yessenia Montero RN  05/07/23 2026

## 2023-05-08 NOTE — PLAN OF CARE
Problem: ABCDS Injury Assessment  Goal: Absence of physical injury  5/8/2023 1319 by Nicho Salgado RN  Outcome: Progressing  Flowsheets (Taken 5/8/2023 2264)  Absence of Physical Injury: Implement safety measures based on patient assessment  5/8/2023 0637 by Pat Santos LPN  Outcome: Progressing     Problem: Safety - Adult  Goal: Free from fall injury  Outcome: Progressing     Problem: Pain  Goal: Verbalizes/displays adequate comfort level or baseline comfort level  Outcome: HH/HSPC Resolved Met

## 2023-05-08 NOTE — CARE COORDINATION
05/08/23 1526   Service Assessment   Patient Orientation Alert and Oriented   Cognition Alert   History Provided By Patient   Primary Caregiver Self   Support Systems Spouse/Significant Other   Patient's Healthcare Decision Maker is: Legal Next of Kin   PCP Verified by CM Yes   Last Visit to PCP Within last 6 months   Prior Functional Level Independent in ADLs/IADLs   Current Functional Level Independent in ADLs/IADLs   Can patient return to prior living arrangement Yes   Ability to make needs known: Good   Family able to assist with home care needs: Yes   Would you like for me to discuss the discharge plan with any other family members/significant others, and if so, who?  No   Financial Resources Other (Comment)  (Private insurance)   Community Resources None   Social/Functional History   Lives With Spouse   Discharge Planning   Type of Residence House   Living Arrangements Spouse/Significant Other   Patient expects to be discharged to: [employer]

## 2023-05-08 NOTE — PROGRESS NOTES
Dual Skin assessment by Clifford Fajardo LPN and Heyward Apley RN. No noted skin breakdown. I agree with the above skin assessment.   Heyward Apley RN

## 2023-05-08 NOTE — ED NOTES
Pt placed on continuous pulse oximeter and cardiac monitor for continuous monitoring upon arrival to room.       Davonte Monterroso RN  05/07/23 2031

## 2023-05-08 NOTE — PROGRESS NOTES
PT eval order received and acknowledged. Pt screened and is currently presenting with indep functional mobility/transfers. PT evaluation order will be discontinued at this time as pt has no acute PT needs. Please reorder PT if pt functional status changes. Thank you. [facility] Mobility Inpatient Short Form  How much difficulty does the patient currently have. .. Unable A Lot A Little None   1. Turning over in bed (including adjusting bedclothes, sheets and blankets)? [] 1   [] 2   [] 3   [x] 4   2. Sitting down on and standing up from a chair with arms ( e.g., wheelchair, bedside commode, etc.)   [] 1   [] 2   [] 3   [x] 4   3. Moving from lying on back to sitting on the side of the bed? [] 1   [] 2   [] 3   [x] 4          How much help from another person does the patient currently need. .. Total A Lot A Little None   4. Moving to and from a bed to a chair (including a wheelchair)? [] 1   [] 2   [] 3   [x] 4   5. Need to walk in hospital room? [] 1   [] 2   [] 3   [x] 4   6. Climbing 3-5 steps with a railing? [] 1   [] 2   [] 3   [x] 4   © 2007, Trustees of [address] [place] [address] [address] [address], under license to [employer]. All rights reserved     Score:  Initial: 24/24 Most Recent: X (Date: 5/8/2023 )   Interpretation of Tool:  Represents activities that are increasingly more difficult (i.e. Bed mobility, Transfers, Gait).   Score 24 23 22-20 19-15 14-10 9-7 6   Modifier CH CI CJ CK CL CM CN

## 2023-05-09 VITALS
OXYGEN SATURATION: 97 % | HEIGHT: 74 IN | TEMPERATURE: 97.6 F | HEART RATE: 79 BPM | DIASTOLIC BLOOD PRESSURE: 76 MMHG | WEIGHT: 197 LBS | BODY MASS INDEX: 25.28 KG/M2 | RESPIRATION RATE: 18 BRPM | SYSTOLIC BLOOD PRESSURE: 146 MMHG

## 2023-05-09 PROBLEM — I95.9 HYPOTENSION: Status: RESOLVED | Noted: 2023-05-07 | Resolved: 2023-05-09

## 2023-05-09 PROBLEM — N17.9 AKI (ACUTE KIDNEY INJURY) (HCC): Status: ACTIVE | Noted: 2023-05-09

## 2023-05-09 PROBLEM — N18.9 CKD (CHRONIC KIDNEY DISEASE): Status: ACTIVE | Noted: 2023-05-09

## 2023-05-09 LAB
ANION GAP SERPL CALC-SCNC: 6 MMOL/L (ref 5–15)
APPEARANCE UR: CLEAR
BACTERIA URNS QL MICRO: NEGATIVE /HPF
BASOPHILS # BLD: 0.1 K/UL (ref 0–0.1)
BASOPHILS NFR BLD: 1 % (ref 0–1)
BILIRUB UR QL: NEGATIVE
BUN SERPL-MCNC: 92 MG/DL (ref 6–20)
BUN/CREAT SERPL: 20 (ref 12–20)
CA-I BLD-MCNC: 8 MG/DL (ref 8.5–10.1)
CA-I BLD-MCNC: 8.1 MG/DL (ref 8.5–10.1)
CHLORIDE SERPL-SCNC: 109 MMOL/L (ref 97–108)
CO2 SERPL-SCNC: 21 MMOL/L (ref 21–32)
COLOR UR: ABNORMAL
CREAT SERPL-MCNC: 4.53 MG/DL (ref 0.7–1.3)
DIFFERENTIAL METHOD BLD: ABNORMAL
EOSINOPHIL # BLD: 0.3 K/UL (ref 0–0.4)
EOSINOPHIL NFR BLD: 5 % (ref 0–7)
ERYTHROCYTE [DISTWIDTH] IN BLOOD BY AUTOMATED COUNT: 11.6 % (ref 11.5–14.5)
FERRITIN SERPL-MCNC: 120 NG/ML (ref 26–388)
GLUCOSE BLD STRIP.AUTO-MCNC: 132 MG/DL (ref 65–100)
GLUCOSE SERPL-MCNC: 126 MG/DL (ref 65–100)
GLUCOSE UR STRIP.AUTO-MCNC: NEGATIVE MG/DL
HCT VFR BLD AUTO: 29.1 % (ref 36.6–50.3)
HGB BLD-MCNC: 9.3 G/DL (ref 12.1–17)
HGB UR QL STRIP: NEGATIVE
IMM GRANULOCYTES # BLD AUTO: 0 K/UL (ref 0–0.04)
IMM GRANULOCYTES NFR BLD AUTO: 0 % (ref 0–0.5)
IRON SATN MFR SERPL: 48 % (ref 20–50)
IRON SERPL-MCNC: 108 UG/DL (ref 35–150)
KETONES UR QL STRIP.AUTO: NEGATIVE MG/DL
LEUKOCYTE ESTERASE UR QL STRIP.AUTO: NEGATIVE
LYMPHOCYTES # BLD: 1.1 K/UL (ref 0.8–3.5)
LYMPHOCYTES NFR BLD: 17 % (ref 12–49)
MCH RBC QN AUTO: 31.3 PG (ref 26–34)
MCHC RBC AUTO-ENTMCNC: 32 G/DL (ref 30–36.5)
MCV RBC AUTO: 98 FL (ref 80–99)
MONOCYTES # BLD: 0.5 K/UL (ref 0–1)
MONOCYTES NFR BLD: 7 % (ref 5–13)
MUCOUS THREADS URNS QL MICRO: NEGATIVE /LPF
NEUTS SEG # BLD: 4.5 K/UL (ref 1.8–8)
NEUTS SEG NFR BLD: 70 % (ref 32–75)
NITRITE UR QL STRIP.AUTO: NEGATIVE
NRBC # BLD: 0 K/UL (ref 0–0.01)
NRBC BLD-RTO: 0 PER 100 WBC
PERFORMED BY:: ABNORMAL
PH UR STRIP: 6 (ref 5–8)
PLATELET # BLD AUTO: 223 K/UL (ref 150–400)
PMV BLD AUTO: 12.1 FL (ref 8.9–12.9)
POTASSIUM SERPL-SCNC: 5.3 MMOL/L (ref 3.5–5.1)
PROT UR STRIP-MCNC: 100 MG/DL
PTH-INTACT SERPL-MCNC: 440.1 PG/ML (ref 18.4–88)
RBC # BLD AUTO: 2.97 M/UL (ref 4.1–5.7)
RBC #/AREA URNS HPF: NORMAL /HPF (ref 0–5)
SODIUM SERPL-SCNC: 136 MMOL/L (ref 136–145)
SP GR UR REFRACTOMETRY: 1.01 (ref 1–1.03)
TIBC SERPL-MCNC: 223 UG/DL (ref 250–450)
UROBILINOGEN UR QL STRIP.AUTO: 0.1 EU/DL (ref 0.1–1)
WBC # BLD AUTO: 6.4 K/UL (ref 4.1–11.1)
WBC URNS QL MICRO: NORMAL /HPF (ref 0–4)

## 2023-05-09 PROCEDURE — 2580000003 HC RX 258: Performed by: INTERNAL MEDICINE

## 2023-05-09 PROCEDURE — 82962 GLUCOSE BLOOD TEST: CPT

## 2023-05-09 PROCEDURE — 81001 URINALYSIS AUTO W/SCOPE: CPT

## 2023-05-09 PROCEDURE — 85025 COMPLETE CBC W/AUTO DIFF WBC: CPT

## 2023-05-09 PROCEDURE — 6360000002 HC RX W HCPCS: Performed by: INTERNAL MEDICINE

## 2023-05-09 PROCEDURE — 82728 ASSAY OF FERRITIN: CPT

## 2023-05-09 PROCEDURE — 36415 COLL VENOUS BLD VENIPUNCTURE: CPT

## 2023-05-09 PROCEDURE — 80048 BASIC METABOLIC PNL TOTAL CA: CPT

## 2023-05-09 PROCEDURE — 83540 ASSAY OF IRON: CPT

## 2023-05-09 PROCEDURE — G0378 HOSPITAL OBSERVATION PER HR: HCPCS

## 2023-05-09 PROCEDURE — 6370000000 HC RX 637 (ALT 250 FOR IP): Performed by: INTERNAL MEDICINE

## 2023-05-09 RX ORDER — SODIUM CHLORIDE 9 MG/ML
INJECTION, SOLUTION INTRAVENOUS CONTINUOUS
Status: DISCONTINUED | OUTPATIENT
Start: 2023-05-09 | End: 2023-05-09 | Stop reason: HOSPADM

## 2023-05-09 RX ORDER — HYDRALAZINE HYDROCHLORIDE 50 MG/1
50 TABLET, FILM COATED ORAL EVERY 8 HOURS SCHEDULED
Qty: 90 TABLET | Refills: 3 | Status: SHIPPED | OUTPATIENT
Start: 2023-05-09 | End: 2023-06-08

## 2023-05-09 RX ADMIN — HYDRALAZINE HYDROCHLORIDE 50 MG: 50 TABLET, FILM COATED ORAL at 05:15

## 2023-05-09 RX ADMIN — HEPARIN SODIUM 5000 UNITS: 5000 INJECTION INTRAVENOUS; SUBCUTANEOUS at 05:14

## 2023-05-09 RX ADMIN — SODIUM CHLORIDE: 9 INJECTION, SOLUTION INTRAVENOUS at 08:09

## 2023-05-09 RX ADMIN — SODIUM CHLORIDE, PRESERVATIVE FREE 10 ML: 5 INJECTION INTRAVENOUS at 08:09

## 2023-05-09 ASSESSMENT — PAIN SCALES - GENERAL: PAINLEVEL_OUTOF10: 0

## 2023-05-09 NOTE — CARE COORDINATION
CM noted discharge order. Patient has no CM needs at discharge. Primary RN made aware. Transition of Care Plan:    RUR:12%  Prior Level of Functioning:Independent  Disposition: Home/self  If SNF or IPR: Date FOC offered:  Date FOC received:  Accepting facility:  Date authorization started with reference number:  Date authorization received and expires: Follow up appointments: Yes  DME needed: No  Transportation at discharge: Wife  IM/IMM Medicare/ letter given:  Is patient a  and connected with VA? If yes, was [employer] transfer form completed and VA notified? N/A  Caregiver Latasha Small, Wife, [phone]  Discharge Caregiver contacted prior to discharge?  Yes  Care Conference needed? no  Barriers to discharge: N/A

## 2023-05-09 NOTE — DISCHARGE INSTR - COC
Rehab): Good    Recommended Labs or Other Treatments After Discharge:       Physician Certification: I certify the above information and transfer of Kelsie Credit.  is necessary for the continuing treatment of the diagnosis listed and that he requires {Admit to Appropriate Level of Care:10354} for {GREATER/LESS:260303075} 30 days.      Update Admission H&P: {CHP DME Changes in MYQLJ:195483155}    PHYSICIAN SIGNATURE:  {Esignature:[id number]}

## 2023-05-09 NOTE — DISCHARGE SUMMARY
aspirin 81 MG EC tablet     atorvastatin 40 MG tablet  Commonly known as: LIPITOR     Ozempic (1 MG/DOSE) 4 MG/3ML Sopn  Generic drug: Semaglutide (1 MG/DOSE)     pioglitazone 30 MG tablet  Commonly known as: ACTOS            STOP taking these medications      bumetanide 2 MG tablet  Commonly known as: BUMEX     lisinopril 20 MG tablet  Commonly known as: PRINIVIL;ZESTRIL               Where to Get Your Medications        These medications were sent to [facility] - [address] [phone] Tianna Ott [phone] [address], [address] [facility] [address]      Phone: [phone]   hydrALAZINE 50 MG tablet           Follow up Care:    1. Catrina Weaver MD in 1-2 weeks. Follow-up with Dr. Bud Perez in 1 week      Patient Follow Up Instructions: Activity: activity as tolerated  Diet:  renal diet  Wound Care: none needed     Condition at Discharge:  Stable  __________________________________________________________________    Disposition  Home ____________________________________________________________________      ___________________________________________________________________    Discharge Exam:  Patient seen and examined by me on discharge day. Pertinent Findings:  Gen:    Not in distress  Chest: Clear lungs  CVS:   Regular rhythm.   No edema  Abd:  Soft, not distended, not tender  Neuro:  Alert        CONSULTATIONS: nephrology    Significant Diagnostic Studies:   Recent Results (from the past 24 hour(s))   POCT Glucose    Collection Time: 05/08/23 12:17 PM   Result Value Ref Range    POC Glucose 137 (H) 65 - 100 mg/dL    Performed by: Eugene Hill    PTH, Intact    Collection Time: 05/08/23  2:35 PM   Result Value Ref Range    Calcium 8.1 (L) 8.5 - 10.1 mg/dL    Pth Intact PENDING pg/mL   POCT Glucose    Collection Time: 05/08/23  5:18 PM   Result Value Ref Range    POC Glucose 133 (H) 65 - 100 mg/dL    Performed by: Eugene Hill    POCT Glucose    Collection

## 2023-05-09 NOTE — PROGRESS NOTES
Hospitalist Progress Note               Daily Progress Note: 5/9/2023 9:05 AM  Hospital course:     Orquidea Woods is a 61 y.o. male with PMH of hypertension, diabetes and CKD. He presented to the ED with chief complaint of pre-syncope episodes. He was working on his back yard under the hot sun and was sweating profusely yesterday. Subsequently had and episode of lightheadedness after bending down, did not loss consciousness. He admits to not drinking water and only drink gatorade. Also has CKD, recently started on hydralazine and bumex by his nephrology. In the ED, initially hypotensive, improved with IVF resuscitation. Lab work significant for MOLINA, creatinine 6.8, baseline 3.5. Subjective:         Assessment/Plan:   Principal Problem:    Hypotension  Resolved Problems:    * No resolved hospital problems. *    MOLINA on CKD stage IV (eGRR 15-29)  Secondary to dehydration and new anti-hypertensive medications. consult nephrology. Continue IV fluids. Decrease home hydralazine. Hold bumex and lisinopril for now. Hypertension  Hold bumex and lisinopril due to MOLINA. Continue hydralazine at lower dose. Diet controlled diabetes  States that he has been taken off of all oral medications. POC + correctional insulin   HbA1c of 5.3 noted.     Coding   DVT Prophylaxis:  Code Status: Full Code {Code Details:154595008:p}  POA/NOK:    Disposition and discharge barriers:   Nepho clearance  Care Plan discussed with: Staff, patient, IDR team    Current Facility-Administered Medications   Medication Dose Route Frequency    0.9 % sodium chloride infusion   IntraVENous Continuous    hydrALAZINE (APRESOLINE) tablet 50 mg  50 mg Oral 3 times per day    insulin lispro (HUMALOG) injection vial 0-4 Units  0-4 Units SubCUTAneous TID WC    insulin lispro (HUMALOG) injection vial 0-4 Units  0-4 Units SubCUTAneous Nightly    sodium chloride flush 0.9 % injection 5-40 mL  5-40 mL IntraVENous 2 times per day    sodium

## 2023-05-11 LAB — EPO SERPL-ACNC: 5.8 MIU/ML (ref 2.6–18.5)

## 2023-07-02 ENCOUNTER — HOSPITAL ENCOUNTER (EMERGENCY)
Facility: HOSPITAL | Age: 58
Discharge: HOME | End: 2023-07-02
Attending: EMERGENCY MEDICINE
Payer: COMMERCIAL

## 2023-07-02 VITALS
SYSTOLIC BLOOD PRESSURE: 169 MMHG | HEIGHT: 74 IN | RESPIRATION RATE: 20 BRPM | DIASTOLIC BLOOD PRESSURE: 94 MMHG | OXYGEN SATURATION: 100 % | HEART RATE: 70 BPM | WEIGHT: 215 LBS | BODY MASS INDEX: 27.59 KG/M2 | TEMPERATURE: 97.5 F

## 2023-07-02 DIAGNOSIS — E87.5 HYPERKALEMIA: ICD-10-CM

## 2023-07-02 DIAGNOSIS — R60.9 PERIPHERAL EDEMA: ICD-10-CM

## 2023-07-02 DIAGNOSIS — N18.9 CHRONIC RENAL IMPAIRMENT, UNSPECIFIED CKD STAGE: Primary | ICD-10-CM

## 2023-07-02 LAB
ALBUMIN SERPL-MCNC: 2.5 G/DL (ref 3.5–5)
ALBUMIN/GLOB SERPL: 0.8 (ref 1.1–2.2)
ALP SERPL-CCNC: 124 U/L (ref 45–117)
ALT SERPL-CCNC: 40 U/L (ref 12–78)
ANION GAP SERPL CALC-SCNC: 3 MMOL/L (ref 5–15)
AST SERPL-CCNC: 32 U/L (ref 15–37)
BASOPHILS # BLD: 0.1 K/UL (ref 0–0.1)
BASOPHILS NFR BLD: 1 % (ref 0–1)
BILIRUB SERPL-MCNC: 0.3 MG/DL (ref 0.2–1)
BUN SERPL-MCNC: 50 MG/DL (ref 6–20)
BUN/CREAT SERPL: 13 (ref 12–20)
CALCIUM SERPL-MCNC: 7.7 MG/DL (ref 8.5–10.1)
CHLORIDE SERPL-SCNC: 115 MMOL/L (ref 97–108)
CO2 SERPL-SCNC: 24 MMOL/L (ref 21–32)
CREAT SERPL-MCNC: 3.83 MG/DL (ref 0.7–1.3)
DIFFERENTIAL METHOD BLD: ABNORMAL
EOSINOPHIL # BLD: 0.3 K/UL (ref 0–0.4)
EOSINOPHIL NFR BLD: 5 % (ref 0–7)
ERYTHROCYTE [DISTWIDTH] IN BLOOD BY AUTOMATED COUNT: 12.4 % (ref 11.5–14.5)
GLOBULIN SER CALC-MCNC: 3.2 G/DL (ref 2–4)
GLUCOSE SERPL-MCNC: 113 MG/DL (ref 65–100)
HCT VFR BLD AUTO: 28.8 % (ref 36.6–50.3)
HGB BLD-MCNC: 9.2 G/DL (ref 12.1–17)
IMM GRANULOCYTES # BLD AUTO: 0 K/UL (ref 0–0.04)
IMM GRANULOCYTES NFR BLD AUTO: 0 % (ref 0–0.5)
LYMPHOCYTES # BLD: 1 K/UL (ref 0.8–3.5)
LYMPHOCYTES NFR BLD: 18 % (ref 12–49)
MCH RBC QN AUTO: 31 PG (ref 26–34)
MCHC RBC AUTO-ENTMCNC: 31.9 G/DL (ref 30–36.5)
MCV RBC AUTO: 97 FL (ref 80–99)
MONOCYTES # BLD: 0.3 K/UL (ref 0–1)
MONOCYTES NFR BLD: 6 % (ref 5–13)
NEUTS SEG # BLD: 3.9 K/UL (ref 1.8–8)
NEUTS SEG NFR BLD: 70 % (ref 32–75)
NRBC # BLD: 0 K/UL (ref 0–0.01)
NRBC BLD-RTO: 0 PER 100 WBC
NT PRO BNP: 1462 PG/ML
PLATELET # BLD AUTO: 239 K/UL (ref 150–400)
PMV BLD AUTO: 11.9 FL (ref 8.9–12.9)
POTASSIUM SERPL-SCNC: 5.4 MMOL/L (ref 3.5–5.1)
PROT SERPL-MCNC: 5.7 G/DL (ref 6.4–8.2)
RBC # BLD AUTO: 2.97 M/UL (ref 4.1–5.7)
SODIUM SERPL-SCNC: 142 MMOL/L (ref 136–145)
WBC # BLD AUTO: 5.5 K/UL (ref 4.1–11.1)

## 2023-07-02 PROCEDURE — 6370000000 HC RX 637 (ALT 250 FOR IP): Performed by: EMERGENCY MEDICINE

## 2023-07-02 PROCEDURE — 85025 COMPLETE CBC W/AUTO DIFF WBC: CPT

## 2023-07-02 PROCEDURE — 36415 COLL VENOUS BLD VENIPUNCTURE: CPT

## 2023-07-02 PROCEDURE — 80053 COMPREHEN METABOLIC PANEL: CPT

## 2023-07-02 PROCEDURE — 83880 ASSAY OF NATRIURETIC PEPTIDE: CPT

## 2023-07-02 PROCEDURE — 99283 EMERGENCY DEPT VISIT LOW MDM: CPT

## 2023-07-02 RX ADMIN — SODIUM ZIRCONIUM CYCLOSILICATE 5 G: 5 POWDER, FOR SUSPENSION ORAL at 13:07

## 2023-08-14 DIAGNOSIS — E11.65 TYPE 2 DIABETES MELLITUS WITH HYPERGLYCEMIA, UNSPECIFIED WHETHER LONG TERM INSULIN USE (HCC): Primary | ICD-10-CM

## 2023-08-21 RX ORDER — SEMAGLUTIDE 1.34 MG/ML
INJECTION, SOLUTION SUBCUTANEOUS
Qty: 9 ML | Refills: 3 | Status: SHIPPED | OUTPATIENT
Start: 2023-08-21

## 2024-09-11 ENCOUNTER — OFFICE VISIT (OUTPATIENT)
Age: 59
End: 2024-09-11
Payer: COMMERCIAL

## 2024-09-11 VITALS
TEMPERATURE: 97.9 F | OXYGEN SATURATION: 96 % | HEART RATE: 71 BPM | DIASTOLIC BLOOD PRESSURE: 81 MMHG | HEIGHT: 74 IN | BODY MASS INDEX: 26.18 KG/M2 | SYSTOLIC BLOOD PRESSURE: 150 MMHG | WEIGHT: 204 LBS

## 2024-09-11 DIAGNOSIS — E78.2 MIXED HYPERLIPIDEMIA: ICD-10-CM

## 2024-09-11 DIAGNOSIS — N18.5 STAGE 5 CHRONIC KIDNEY DISEASE NOT ON CHRONIC DIALYSIS (HCC): ICD-10-CM

## 2024-09-11 DIAGNOSIS — E11.65 TYPE 2 DIABETES MELLITUS WITH HYPERGLYCEMIA, UNSPECIFIED WHETHER LONG TERM INSULIN USE (HCC): Primary | ICD-10-CM

## 2024-09-11 PROCEDURE — 3077F SYST BP >= 140 MM HG: CPT | Performed by: INTERNAL MEDICINE

## 2024-09-11 PROCEDURE — 3079F DIAST BP 80-89 MM HG: CPT | Performed by: INTERNAL MEDICINE

## 2024-09-11 PROCEDURE — 99214 OFFICE O/P EST MOD 30 MIN: CPT | Performed by: INTERNAL MEDICINE

## 2024-09-11 RX ORDER — CLOPIDOGREL BISULFATE 75 MG/1
75 TABLET ORAL DAILY
COMMUNITY
End: 2024-09-11 | Stop reason: ALTCHOICE

## 2024-09-11 RX ORDER — ATORVASTATIN CALCIUM 40 MG/1
TABLET, FILM COATED ORAL
Qty: 90 TABLET | Refills: 3
Start: 2024-09-11

## 2024-09-11 RX ORDER — LEVETIRACETAM 500 MG/1
500 TABLET ORAL 2 TIMES DAILY
COMMUNITY

## 2024-09-11 RX ORDER — CLOPIDOGREL BISULFATE 75 MG/1
75 TABLET ORAL DAILY
Qty: 30 TABLET | Refills: 0
Start: 2024-09-11

## 2024-09-13 LAB
ALBUMIN SERPL-MCNC: 3.5 G/DL (ref 3.8–4.9)
ALP SERPL-CCNC: 129 IU/L (ref 44–121)
ALT SERPL-CCNC: 24 IU/L (ref 0–44)
AST SERPL-CCNC: 17 IU/L (ref 0–40)
BILIRUB SERPL-MCNC: <0.2 MG/DL (ref 0–1.2)
BUN SERPL-MCNC: 74 MG/DL (ref 6–24)
BUN/CREAT SERPL: 12 (ref 9–20)
CALCIUM SERPL-MCNC: 7.7 MG/DL (ref 8.7–10.2)
CHLORIDE SERPL-SCNC: 110 MMOL/L (ref 96–106)
CHOLEST SERPL-MCNC: 213 MG/DL (ref 100–199)
CO2 SERPL-SCNC: 16 MMOL/L (ref 20–29)
CREAT SERPL-MCNC: 6.01 MG/DL (ref 0.76–1.27)
EGFRCR SERPLBLD CKD-EPI 2021: 10 ML/MIN/1.73
GLOBULIN SER CALC-MCNC: 2.5 G/DL (ref 1.5–4.5)
GLUCOSE SERPL-MCNC: 120 MG/DL (ref 70–99)
HBA1C MFR BLD: 5.5 % (ref 4.8–5.6)
HDLC SERPL-MCNC: 56 MG/DL
IMP & REVIEW OF LAB RESULTS: NORMAL
LDLC SERPL CALC-MCNC: 144 MG/DL (ref 0–99)
Lab: NORMAL
POTASSIUM SERPL-SCNC: 5.6 MMOL/L (ref 3.5–5.2)
PROT SERPL-MCNC: 6 G/DL (ref 6–8.5)
REPORT: NORMAL
REPORT: NORMAL
SODIUM SERPL-SCNC: 138 MMOL/L (ref 134–144)
TRIGL SERPL-MCNC: 73 MG/DL (ref 0–149)
VLDLC SERPL CALC-MCNC: 13 MG/DL (ref 5–40)

## 2024-09-17 ENCOUNTER — TELEPHONE (OUTPATIENT)
Age: 59
End: 2024-09-17

## 2024-09-30 DIAGNOSIS — E11.65 TYPE 2 DIABETES MELLITUS WITH HYPERGLYCEMIA, UNSPECIFIED WHETHER LONG TERM INSULIN USE (HCC): Primary | ICD-10-CM

## 2024-10-01 RX ORDER — LISINOPRIL 20 MG/1
20 TABLET ORAL 2 TIMES DAILY
Qty: 180 TABLET | Refills: 3 | Status: SHIPPED | OUTPATIENT
Start: 2024-10-01 | End: 2024-10-01 | Stop reason: CLARIF

## 2024-10-11 ENCOUNTER — TRANSCRIBE ORDERS (OUTPATIENT)
Facility: HOSPITAL | Age: 59
End: 2024-10-11

## 2024-10-11 ENCOUNTER — HOSPITAL ENCOUNTER (OUTPATIENT)
Facility: HOSPITAL | Age: 59
Discharge: HOME | End: 2024-10-14
Payer: COMMERCIAL

## 2024-10-11 DIAGNOSIS — N18.5 CHRONIC KIDNEY DISEASE, STAGE V (HCC): ICD-10-CM

## 2024-10-11 DIAGNOSIS — N18.5 CHRONIC KIDNEY DISEASE, STAGE V (HCC): Primary | ICD-10-CM

## 2024-10-11 PROCEDURE — 71046 X-RAY EXAM CHEST 2 VIEWS: CPT

## 2024-12-26 ENCOUNTER — TELEPHONE (OUTPATIENT)
Age: 59
End: 2024-12-26

## 2024-12-26 DIAGNOSIS — E11.65 TYPE 2 DIABETES MELLITUS WITH HYPERGLYCEMIA, UNSPECIFIED WHETHER LONG TERM INSULIN USE (HCC): Primary | ICD-10-CM

## 2024-12-26 RX ORDER — HYDROCHLOROTHIAZIDE 12.5 MG/1
CAPSULE ORAL
Qty: 6 EACH | Refills: 3 | Status: SHIPPED | OUTPATIENT
Start: 2024-12-26

## 2024-12-26 NOTE — TELEPHONE ENCOUNTER
Informed pt that sensors may not be covered by insurance. Pt states that is okay he would still like for us to send in the prescription

## 2024-12-26 NOTE — TELEPHONE ENCOUNTER
Hi,    Caller is requesting a Branch CARLEEN PLUS 3. Please send to [address]    Thank you.

## 2024-12-26 NOTE — TELEPHONE ENCOUNTER
Unable to leave message voicemail full. I was going to inform pt insurance may not cover Freestyle Rocio 3 plus sensors because he does not use insulin

## 2025-01-08 ENCOUNTER — TELEPHONE (OUTPATIENT)
Age: 60
End: 2025-01-08

## 2025-01-08 NOTE — TELEPHONE ENCOUNTER
His plan says that he has to check  blood sugars 5 times a day for the past 2 months   to etablish medical need for use of CARLEEN       Moreover,  I did nto recommend to stay  on any diabetic meds   Strictly speaking he does nto even have to check blood sugars from my standpoint      Call and mention to him and his wife     Shell Greenberg MD

## 2025-01-08 NOTE — TELEPHONE ENCOUNTER
Informed pt's wife of Dr. Greenberg's note. Pt's wife verbalized understanding with no further questions or concerns at this time.